# Patient Record
Sex: FEMALE | Race: WHITE | NOT HISPANIC OR LATINO | Employment: FULL TIME | URBAN - METROPOLITAN AREA
[De-identification: names, ages, dates, MRNs, and addresses within clinical notes are randomized per-mention and may not be internally consistent; named-entity substitution may affect disease eponyms.]

---

## 2021-01-01 ENCOUNTER — APPOINTMENT (EMERGENCY)
Dept: RADIOLOGY | Facility: HOSPITAL | Age: 56
DRG: 871 | End: 2021-01-01
Payer: COMMERCIAL

## 2021-01-01 ENCOUNTER — APPOINTMENT (INPATIENT)
Dept: NON INVASIVE DIAGNOSTICS | Facility: HOSPITAL | Age: 56
DRG: 871 | End: 2021-01-01
Payer: COMMERCIAL

## 2021-01-01 ENCOUNTER — APPOINTMENT (INPATIENT)
Dept: RADIOLOGY | Facility: HOSPITAL | Age: 56
DRG: 871 | End: 2021-01-01
Payer: COMMERCIAL

## 2021-01-01 ENCOUNTER — HOSPITAL ENCOUNTER (INPATIENT)
Facility: HOSPITAL | Age: 56
LOS: 1 days | DRG: 871 | End: 2021-07-28
Attending: EMERGENCY MEDICINE | Admitting: INTERNAL MEDICINE
Payer: COMMERCIAL

## 2021-01-01 ENCOUNTER — APPOINTMENT (OUTPATIENT)
Dept: NON INVASIVE DIAGNOSTICS | Facility: HOSPITAL | Age: 56
DRG: 871 | End: 2021-01-01
Attending: EMERGENCY MEDICINE
Payer: COMMERCIAL

## 2021-01-01 VITALS
BODY MASS INDEX: 29.51 KG/M2 | HEART RATE: 93 BPM | DIASTOLIC BLOOD PRESSURE: 84 MMHG | HEIGHT: 66 IN | SYSTOLIC BLOOD PRESSURE: 114 MMHG | TEMPERATURE: 90 F | RESPIRATION RATE: 28 BRPM | WEIGHT: 183.64 LBS | OXYGEN SATURATION: 84 %

## 2021-01-01 DIAGNOSIS — I46.9 CARDIAC ARREST (HCC): ICD-10-CM

## 2021-01-01 DIAGNOSIS — I21.3 STEMI (ST ELEVATION MYOCARDIAL INFARCTION) (HCC): Primary | ICD-10-CM

## 2021-01-01 DIAGNOSIS — R09.2 RESPIRATORY ARREST (HCC): ICD-10-CM

## 2021-01-01 DIAGNOSIS — R57.9 SHOCK (HCC): ICD-10-CM

## 2021-01-01 LAB
ALBUMIN SERPL BCP-MCNC: 1.2 G/DL (ref 3.5–5)
ALBUMIN SERPL BCP-MCNC: 1.7 G/DL (ref 3.5–5)
ALP SERPL-CCNC: 207 U/L (ref 46–116)
ALP SERPL-CCNC: 236 U/L (ref 46–116)
ALT SERPL W P-5'-P-CCNC: 326 U/L (ref 12–78)
ALT SERPL W P-5'-P-CCNC: 420 U/L (ref 12–78)
AMORPH URATE CRY URNS QL MICRO: ABNORMAL /HPF
AMPHETAMINES SERPL QL SCN: NEGATIVE
ANION GAP SERPL CALCULATED.3IONS-SCNC: 26 MMOL/L (ref 4–13)
ANION GAP SERPL CALCULATED.3IONS-SCNC: 26 MMOL/L (ref 4–13)
ANION GAP SERPL CALCULATED.3IONS-SCNC: 29 MMOL/L (ref 4–13)
ANISOCYTOSIS BLD QL SMEAR: PRESENT
APAP SERPL-MCNC: 21.4 UG/ML (ref 10–20)
APTT PPP: 75 SECONDS (ref 23–37)
ARTERIAL PATENCY WRIST A: NO
AST SERPL W P-5'-P-CCNC: 1277 U/L (ref 5–45)
AST SERPL W P-5'-P-CCNC: 1369 U/L (ref 5–45)
ATRIAL RATE: 197 BPM
ATRIAL RATE: 214 BPM
ATRIAL RATE: 68 BPM
BACTERIA UR QL AUTO: ABNORMAL /HPF
BARBITURATES UR QL: NEGATIVE
BASE EXCESS BLDA CALC-SCNC: -12 MMOL/L (ref -2–3)
BASE EXCESS BLDA CALC-SCNC: -23.4 MMOL/L
BASE EXCESS BLDA CALC-SCNC: -29 MMOL/L (ref -2–3)
BASE EXCESS BLDA CALC-SCNC: <-30 MMOL/L (ref -2–3)
BASOPHILS # BLD MANUAL: 0 THOUSAND/UL (ref 0–0.1)
BASOPHILS NFR MAR MANUAL: 0 % (ref 0–1)
BENZODIAZ UR QL: NEGATIVE
BETA-HYDROXYBUTYRATE: 2.2 MMOL/L
BILIRUB SERPL-MCNC: 0.45 MG/DL (ref 0.2–1)
BILIRUB SERPL-MCNC: 0.53 MG/DL (ref 0.2–1)
BILIRUB UR QL STRIP: NEGATIVE
BUN SERPL-MCNC: 60 MG/DL (ref 5–25)
BUN SERPL-MCNC: 67 MG/DL (ref 5–25)
BUN SERPL-MCNC: 68 MG/DL (ref 5–25)
CA-I BLD-SCNC: 1.33 MMOL/L (ref 1.12–1.32)
CALCIUM ALBUM COR SERPL-MCNC: 12.2 MG/DL (ref 8.3–10.1)
CALCIUM ALBUM COR SERPL-MCNC: 12.2 MG/DL (ref 8.3–10.1)
CALCIUM SERPL-MCNC: 10 MG/DL (ref 8.3–10.1)
CALCIUM SERPL-MCNC: 10.4 MG/DL (ref 8.3–10.1)
CALCIUM SERPL-MCNC: 11 MG/DL (ref 8.3–10.1)
CHLORIDE SERPL-SCNC: 75 MMOL/L (ref 100–108)
CHLORIDE SERPL-SCNC: 81 MMOL/L (ref 100–108)
CHLORIDE SERPL-SCNC: 91 MMOL/L (ref 100–108)
CHOLEST SERPL-MCNC: 177 MG/DL (ref 50–200)
CK MB SERPL-MCNC: 1.2 % (ref 0–2.5)
CK MB SERPL-MCNC: 385.3 NG/ML (ref 0–5)
CK SERPL-CCNC: ABNORMAL U/L (ref 26–192)
CLARITY UR: CLEAR
CO2 SERPL-SCNC: 12 MMOL/L (ref 21–32)
CO2 SERPL-SCNC: 18 MMOL/L (ref 21–32)
CO2 SERPL-SCNC: 8 MMOL/L (ref 21–32)
COCAINE UR QL: NEGATIVE
COLOR UR: YELLOW
CREAT SERPL-MCNC: 2.99 MG/DL (ref 0.6–1.3)
CREAT SERPL-MCNC: 3.57 MG/DL (ref 0.6–1.3)
CREAT SERPL-MCNC: 3.61 MG/DL (ref 0.6–1.3)
CRP SERPL QL: 50.1 MG/L
D DIMER PPP FEU-MCNC: 11.78 UG/ML FEU
EOSINOPHIL # BLD MANUAL: 0 THOUSAND/UL (ref 0–0.4)
EOSINOPHIL NFR BLD MANUAL: 0 % (ref 0–6)
ERYTHROCYTE [DISTWIDTH] IN BLOOD BY AUTOMATED COUNT: 14 % (ref 11.6–15.1)
ERYTHROCYTE [DISTWIDTH] IN BLOOD BY AUTOMATED COUNT: 14.4 % (ref 11.6–15.1)
ETHANOL SERPL-MCNC: <3 MG/DL (ref 0–3)
FERRITIN SERPL-MCNC: ABNORMAL NG/ML (ref 8–388)
FIO2 GAS DIL.REBREATH: 100 L
FIO2 GAS DIL.REBREATH: 100 L
GFR SERPL CREATININE-BSD FRML MDRD: 14 ML/MIN/1.73SQ M
GFR SERPL CREATININE-BSD FRML MDRD: 14 ML/MIN/1.73SQ M
GFR SERPL CREATININE-BSD FRML MDRD: 17 ML/MIN/1.73SQ M
GLUCOSE SERPL-MCNC: 517 MG/DL (ref 65–140)
GLUCOSE SERPL-MCNC: 542 MG/DL (ref 65–140)
GLUCOSE SERPL-MCNC: 857 MG/DL (ref 65–140)
GLUCOSE SERPL-MCNC: 913 MG/DL (ref 65–140)
GLUCOSE SERPL-MCNC: >500 MG/DL (ref 65–140)
GLUCOSE SERPL-MCNC: >500 MG/DL (ref 65–140)
GLUCOSE SERPL-MCNC: >600 MG/DL (ref 65–140)
GLUCOSE SERPL-MCNC: >600 MG/DL (ref 65–140)
GLUCOSE UR STRIP-MCNC: ABNORMAL MG/DL
HBV CORE AB SER QL: NORMAL
HBV CORE IGM SER QL: NORMAL
HBV SURFACE AG SER QL: NORMAL
HCO3 BLDA-SCNC: 10.2 MMOL/L (ref 22–28)
HCO3 BLDA-SCNC: 17.7 MMOL/L (ref 22–28)
HCO3 BLDA-SCNC: 8.7 MMOL/L (ref 22–28)
HCO3 BLDA-SCNC: 8.8 MMOL/L (ref 24–30)
HCT VFR BLD AUTO: 43.2 % (ref 34.8–46.1)
HCT VFR BLD AUTO: 55.7 % (ref 34.8–46.1)
HCT VFR BLD CALC: 24 % (ref 34.8–46.1)
HCT VFR BLD CALC: 51 % (ref 34.8–46.1)
HCT VFR BLD CALC: 54 % (ref 34.8–46.1)
HCV AB SER QL: NORMAL
HDLC SERPL-MCNC: 24 MG/DL
HGB BLD-MCNC: 14.1 G/DL (ref 11.5–15.4)
HGB BLD-MCNC: 17.9 G/DL (ref 11.5–15.4)
HGB BLDA-MCNC: 17.3 G/DL (ref 11.5–15.4)
HGB BLDA-MCNC: 18.4 G/DL (ref 11.5–15.4)
HGB BLDA-MCNC: 8.2 G/DL (ref 11.5–15.4)
HGB UR QL STRIP.AUTO: ABNORMAL
HIV 1+2 AB+HIV1 P24 AG SERPL QL IA: NORMAL
HIV1 P24 AG SER QL: NORMAL
HOROWITZ INDEX BLDA+IHG-RTO: 100 MM[HG]
INR PPP: 1.59 (ref 0.84–1.19)
INR PPP: 2.01 (ref 0.84–1.19)
KETONES UR STRIP-MCNC: ABNORMAL MG/DL
LACTATE SERPL-SCNC: 14.7 MMOL/L (ref 0.5–2)
LACTATE SERPL-SCNC: 15.3 MMOL/L (ref 0.5–2)
LACTATE SERPL-SCNC: 16.4 MMOL/L (ref 0.5–2)
LACTATE SERPL-SCNC: 18.5 MMOL/L (ref 0.5–2)
LDLC SERPL CALC-MCNC: 96 MG/DL (ref 0–100)
LEUKOCYTE ESTERASE UR QL STRIP: ABNORMAL
LYMPHOCYTES # BLD AUTO: 27 % (ref 14–44)
LYMPHOCYTES # BLD AUTO: 4.13 THOUSAND/UL (ref 0.6–4.47)
MAGNESIUM SERPL-MCNC: 3.5 MG/DL (ref 1.6–2.6)
MAGNESIUM SERPL-MCNC: 3.6 MG/DL (ref 1.6–2.6)
MAGNESIUM SERPL-MCNC: 4 MG/DL (ref 1.6–2.6)
MCH RBC QN AUTO: 27.9 PG (ref 26.8–34.3)
MCH RBC QN AUTO: 28.1 PG (ref 26.8–34.3)
MCHC RBC AUTO-ENTMCNC: 32.1 G/DL (ref 31.4–37.4)
MCHC RBC AUTO-ENTMCNC: 32.6 G/DL (ref 31.4–37.4)
MCV RBC AUTO: 86 FL (ref 82–98)
MCV RBC AUTO: 87 FL (ref 82–98)
METAMYELOCYTES NFR BLD MANUAL: 1 % (ref 0–1)
METHADONE UR QL: NEGATIVE
MONOCYTES # BLD AUTO: 0.92 THOUSAND/UL (ref 0–1.22)
MONOCYTES NFR BLD: 6 % (ref 4–12)
NEUTROPHILS # BLD MANUAL: 10.1 THOUSAND/UL (ref 1.85–7.62)
NEUTS BAND NFR BLD MANUAL: 9 % (ref 0–8)
NEUTS SEG NFR BLD AUTO: 57 % (ref 43–75)
NITRITE UR QL STRIP: NEGATIVE
NON-SQ EPI CELLS URNS QL MICRO: ABNORMAL /HPF
NRBC BLD AUTO-RTO: 0 /100 WBCS
NRBC BLD AUTO-RTO: 1 /100 WBCS
NT-PROBNP SERPL-MCNC: 6874 PG/ML
O2 CT BLDA-SCNC: 15.3 ML/DL (ref 16–23)
OPIATES UR QL SCN: NEGATIVE
OXYCODONE+OXYMORPHONE UR QL SCN: NEGATIVE
OXYHGB MFR BLDA: 76.8 % (ref 94–97)
P AXIS: 53 DEGREES
PCO2 BLD: 11 MMOL/L (ref 21–32)
PCO2 BLD: 11 MMOL/L (ref 21–32)
PCO2 BLD: 20 MMOL/L (ref 21–32)
PCO2 BLD: 62.2 MM HG (ref 36–44)
PCO2 BLD: 65.2 MM HG (ref 36–44)
PCO2 BLD: 79.1 MM HG (ref 42–50)
PCO2 BLDA: 56.2 MM HG (ref 36–44)
PCP UR QL: NEGATIVE
PEEP RESPIRATORY: 6 CM[H2O]
PH BLD: 6.66 [PH] (ref 7.3–7.4)
PH BLD: 6.74 [PH] (ref 7.35–7.45)
PH BLD: 7.06 [PH] (ref 7.35–7.45)
PH BLDA: 6.88 [PH] (ref 7.35–7.45)
PH UR STRIP.AUTO: 5.5 [PH]
PHOSPHATE SERPL-MCNC: 15.4 MG/DL (ref 2.7–4.5)
PHOSPHATE SERPL-MCNC: >18 MG/DL (ref 2.7–4.5)
PHOSPHATE SERPL-MCNC: >18 MG/DL (ref 2.7–4.5)
PLATELET # BLD AUTO: 156 THOUSANDS/UL (ref 149–390)
PLATELET # BLD AUTO: 186 THOUSANDS/UL (ref 149–390)
PLATELET BLD QL SMEAR: ADEQUATE
PMV BLD AUTO: 11.2 FL (ref 8.9–12.7)
PMV BLD AUTO: 11.4 FL (ref 8.9–12.7)
PO2 BLD: 51 MM HG (ref 75–129)
PO2 BLD: 70 MM HG (ref 75–129)
PO2 BLD: 86 MM HG (ref 35–45)
PO2 BLDA: 66.2 MM HG (ref 75–129)
POTASSIUM BLD-SCNC: 6 MMOL/L (ref 3.5–5.3)
POTASSIUM BLD-SCNC: 7 MMOL/L (ref 3.5–5.3)
POTASSIUM BLD-SCNC: 7.5 MMOL/L (ref 3.5–5.3)
POTASSIUM SERPL-SCNC: 6.7 MMOL/L (ref 3.5–5.3)
POTASSIUM SERPL-SCNC: 7.4 MMOL/L (ref 3.5–5.3)
POTASSIUM SERPL-SCNC: 7.4 MMOL/L (ref 3.5–5.3)
PR INTERVAL: 158 MS
PR INTERVAL: 96 MS
PROCALCITONIN SERPL-MCNC: 5.5 NG/ML
PROT SERPL-MCNC: 3.8 G/DL (ref 6.4–8.2)
PROT SERPL-MCNC: 5.2 G/DL (ref 6.4–8.2)
PROT UR STRIP-MCNC: ABNORMAL MG/DL
PROTHROMBIN TIME: 19 SECONDS (ref 11.6–14.5)
PROTHROMBIN TIME: 22.8 SECONDS (ref 11.6–14.5)
QRS AXIS: 18 DEGREES
QRS AXIS: 43 DEGREES
QRS AXIS: 53 DEGREES
QRSD INTERVAL: 70 MS
QRSD INTERVAL: 82 MS
QRSD INTERVAL: 96 MS
QT INTERVAL: 256 MS
QT INTERVAL: 318 MS
QT INTERVAL: 398 MS
QTC INTERVAL: 391 MS
QTC INTERVAL: 418 MS
QTC INTERVAL: 423 MS
RBC # BLD AUTO: 5.05 MILLION/UL (ref 3.81–5.12)
RBC # BLD AUTO: 6.38 MILLION/UL (ref 3.81–5.12)
RBC #/AREA URNS AUTO: ABNORMAL /HPF
SAO2 % BLD FROM PO2: 48 % (ref 60–85)
SAO2 % BLD FROM PO2: 74 % (ref 60–85)
SAO2 % BLD FROM PO2: 84 % (ref 60–85)
SARS-COV-2 RNA RESP QL NAA+PROBE: POSITIVE
SODIUM BLD-SCNC: 109 MMOL/L (ref 136–145)
SODIUM BLD-SCNC: 110 MMOL/L (ref 136–145)
SODIUM BLD-SCNC: 126 MMOL/L (ref 136–145)
SODIUM SERPL-SCNC: 112 MMOL/L (ref 136–145)
SODIUM SERPL-SCNC: 119 MMOL/L (ref 136–145)
SODIUM SERPL-SCNC: 135 MMOL/L (ref 136–145)
SP GR UR STRIP.AUTO: 1.02 (ref 1–1.03)
SPECIMEN SOURCE: ABNORMAL
T WAVE AXIS: 41 DEGREES
T WAVE AXIS: 54 DEGREES
T WAVE AXIS: 69 DEGREES
THC UR QL: NEGATIVE
TOTAL CELLS COUNTED SPEC: 100
TRIGL SERPL-MCNC: 283 MG/DL
TROPONIN I SERPL-MCNC: 0.36 NG/ML
TROPONIN I SERPL-MCNC: 0.74 NG/ML
TROPONIN I SERPL-MCNC: 0.84 NG/ML
TSH SERPL DL<=0.05 MIU/L-ACNC: 20.2 UIU/ML (ref 0.36–3.74)
TSH SERPL DL<=0.05 MIU/L-ACNC: 32.66 UIU/ML (ref 0.36–3.74)
UROBILINOGEN UR QL STRIP.AUTO: 0.2 E.U./DL
VENT AC: 24
VENT- AC: AC
VENTRICULAR RATE: 160 BPM
VENTRICULAR RATE: 68 BPM
VENTRICULAR RATE: 91 BPM
VT SETTING VENT: 400 ML
WBC # BLD AUTO: 15.3 THOUSAND/UL (ref 4.31–10.16)
WBC # BLD AUTO: 8.4 THOUSAND/UL (ref 4.31–10.16)
WBC #/AREA URNS AUTO: ABNORMAL /HPF

## 2021-01-01 PROCEDURE — 0BH17EZ INSERTION OF ENDOTRACHEAL AIRWAY INTO TRACHEA, VIA NATURAL OR ARTIFICIAL OPENING: ICD-10-PCS | Performed by: EMERGENCY MEDICINE

## 2021-01-01 PROCEDURE — 87340 HEPATITIS B SURFACE AG IA: CPT | Performed by: FAMILY MEDICINE

## 2021-01-01 PROCEDURE — 71045 X-RAY EXAM CHEST 1 VIEW: CPT

## 2021-01-01 PROCEDURE — 84295 ASSAY OF SERUM SODIUM: CPT

## 2021-01-01 PROCEDURE — 82553 CREATINE MB FRACTION: CPT | Performed by: PHYSICIAN ASSISTANT

## 2021-01-01 PROCEDURE — 36600 WITHDRAWAL OF ARTERIAL BLOOD: CPT

## 2021-01-01 PROCEDURE — 87449 NOS EACH ORGANISM AG IA: CPT | Performed by: PHYSICIAN ASSISTANT

## 2021-01-01 PROCEDURE — 86705 HEP B CORE ANTIBODY IGM: CPT | Performed by: FAMILY MEDICINE

## 2021-01-01 PROCEDURE — 85379 FIBRIN DEGRADATION QUANT: CPT | Performed by: FAMILY MEDICINE

## 2021-01-01 PROCEDURE — 82550 ASSAY OF CK (CPK): CPT | Performed by: PHYSICIAN ASSISTANT

## 2021-01-01 PROCEDURE — C1894 INTRO/SHEATH, NON-LASER: HCPCS | Performed by: EMERGENCY MEDICINE

## 2021-01-01 PROCEDURE — 86704 HEP B CORE ANTIBODY TOTAL: CPT | Performed by: FAMILY MEDICINE

## 2021-01-01 PROCEDURE — 83735 ASSAY OF MAGNESIUM: CPT | Performed by: PHYSICIAN ASSISTANT

## 2021-01-01 PROCEDURE — 84100 ASSAY OF PHOSPHORUS: CPT | Performed by: NURSE PRACTITIONER

## 2021-01-01 PROCEDURE — 5A1935Z RESPIRATORY VENTILATION, LESS THAN 24 CONSECUTIVE HOURS: ICD-10-PCS | Performed by: EMERGENCY MEDICINE

## 2021-01-01 PROCEDURE — 84484 ASSAY OF TROPONIN QUANT: CPT | Performed by: PHYSICIAN ASSISTANT

## 2021-01-01 PROCEDURE — 85610 PROTHROMBIN TIME: CPT | Performed by: EMERGENCY MEDICINE

## 2021-01-01 PROCEDURE — 84443 ASSAY THYROID STIM HORMONE: CPT | Performed by: PHYSICIAN ASSISTANT

## 2021-01-01 PROCEDURE — 94760 N-INVAS EAR/PLS OXIMETRY 1: CPT

## 2021-01-01 PROCEDURE — 85007 BL SMEAR W/DIFF WBC COUNT: CPT | Performed by: EMERGENCY MEDICINE

## 2021-01-01 PROCEDURE — 93005 ELECTROCARDIOGRAM TRACING: CPT

## 2021-01-01 PROCEDURE — 80307 DRUG TEST PRSMV CHEM ANLYZR: CPT | Performed by: PHYSICIAN ASSISTANT

## 2021-01-01 PROCEDURE — 85027 COMPLETE CBC AUTOMATED: CPT | Performed by: PHYSICIAN ASSISTANT

## 2021-01-01 PROCEDURE — 36415 COLL VENOUS BLD VENIPUNCTURE: CPT | Performed by: EMERGENCY MEDICINE

## 2021-01-01 PROCEDURE — 82947 ASSAY GLUCOSE BLOOD QUANT: CPT

## 2021-01-01 PROCEDURE — 94002 VENT MGMT INPAT INIT DAY: CPT

## 2021-01-01 PROCEDURE — 86803 HEPATITIS C AB TEST: CPT | Performed by: FAMILY MEDICINE

## 2021-01-01 PROCEDURE — C1769 GUIDE WIRE: HCPCS | Performed by: EMERGENCY MEDICINE

## 2021-01-01 PROCEDURE — B2111ZZ FLUOROSCOPY OF MULTIPLE CORONARY ARTERIES USING LOW OSMOLAR CONTRAST: ICD-10-PCS | Performed by: INTERNAL MEDICINE

## 2021-01-01 PROCEDURE — 99291 CRITICAL CARE FIRST HOUR: CPT | Performed by: INTERNAL MEDICINE

## 2021-01-01 PROCEDURE — 93458 L HRT ARTERY/VENTRICLE ANGIO: CPT | Performed by: INTERNAL MEDICINE

## 2021-01-01 PROCEDURE — 84132 ASSAY OF SERUM POTASSIUM: CPT

## 2021-01-01 PROCEDURE — 80061 LIPID PANEL: CPT | Performed by: FAMILY MEDICINE

## 2021-01-01 PROCEDURE — U0005 INFEC AGEN DETEC AMPLI PROBE: HCPCS | Performed by: EMERGENCY MEDICINE

## 2021-01-01 PROCEDURE — 85014 HEMATOCRIT: CPT

## 2021-01-01 PROCEDURE — 84484 ASSAY OF TROPONIN QUANT: CPT | Performed by: INTERNAL MEDICINE

## 2021-01-01 PROCEDURE — 82948 REAGENT STRIP/BLOOD GLUCOSE: CPT

## 2021-01-01 PROCEDURE — 83605 ASSAY OF LACTIC ACID: CPT | Performed by: PHYSICIAN ASSISTANT

## 2021-01-01 PROCEDURE — 99291 CRITICAL CARE FIRST HOUR: CPT | Performed by: EMERGENCY MEDICINE

## 2021-01-01 PROCEDURE — 82803 BLOOD GASES ANY COMBINATION: CPT

## 2021-01-01 PROCEDURE — 93325 DOPPLER ECHO COLOR FLOW MAPG: CPT | Performed by: INTERNAL MEDICINE

## 2021-01-01 PROCEDURE — 82330 ASSAY OF CALCIUM: CPT | Performed by: PHYSICIAN ASSISTANT

## 2021-01-01 PROCEDURE — 84145 PROCALCITONIN (PCT): CPT | Performed by: FAMILY MEDICINE

## 2021-01-01 PROCEDURE — 85027 COMPLETE CBC AUTOMATED: CPT | Performed by: EMERGENCY MEDICINE

## 2021-01-01 PROCEDURE — 93308 TTE F-UP OR LMTD: CPT | Performed by: INTERNAL MEDICINE

## 2021-01-01 PROCEDURE — 85730 THROMBOPLASTIN TIME PARTIAL: CPT | Performed by: EMERGENCY MEDICINE

## 2021-01-01 PROCEDURE — 99291 CRITICAL CARE FIRST HOUR: CPT

## 2021-01-01 PROCEDURE — 06HY33Z INSERTION OF INFUSION DEVICE INTO LOWER VEIN, PERCUTANEOUS APPROACH: ICD-10-PCS | Performed by: INTERNAL MEDICINE

## 2021-01-01 PROCEDURE — NC001 PR NO CHARGE: Performed by: INTERNAL MEDICINE

## 2021-01-01 PROCEDURE — B2151ZZ FLUOROSCOPY OF LEFT HEART USING LOW OSMOLAR CONTRAST: ICD-10-PCS | Performed by: INTERNAL MEDICINE

## 2021-01-01 PROCEDURE — 84443 ASSAY THYROID STIM HORMONE: CPT | Performed by: FAMILY MEDICINE

## 2021-01-01 PROCEDURE — 82010 KETONE BODYS QUAN: CPT | Performed by: PHYSICIAN ASSISTANT

## 2021-01-01 PROCEDURE — 93321 DOPPLER ECHO F-UP/LMTD STD: CPT | Performed by: INTERNAL MEDICINE

## 2021-01-01 PROCEDURE — 96374 THER/PROPH/DIAG INJ IV PUSH: CPT

## 2021-01-01 PROCEDURE — 94640 AIRWAY INHALATION TREATMENT: CPT

## 2021-01-01 PROCEDURE — 84439 ASSAY OF FREE THYROXINE: CPT | Performed by: FAMILY MEDICINE

## 2021-01-01 PROCEDURE — 83880 ASSAY OF NATRIURETIC PEPTIDE: CPT | Performed by: FAMILY MEDICINE

## 2021-01-01 PROCEDURE — 31500 INSERT EMERGENCY AIRWAY: CPT | Performed by: EMERGENCY MEDICINE

## 2021-01-01 PROCEDURE — 82728 ASSAY OF FERRITIN: CPT | Performed by: FAMILY MEDICINE

## 2021-01-01 PROCEDURE — 81001 URINALYSIS AUTO W/SCOPE: CPT | Performed by: PHYSICIAN ASSISTANT

## 2021-01-01 PROCEDURE — 93010 ELECTROCARDIOGRAM REPORT: CPT | Performed by: INTERNAL MEDICINE

## 2021-01-01 PROCEDURE — 83605 ASSAY OF LACTIC ACID: CPT | Performed by: INTERNAL MEDICINE

## 2021-01-01 PROCEDURE — 86140 C-REACTIVE PROTEIN: CPT | Performed by: FAMILY MEDICINE

## 2021-01-01 PROCEDURE — 76937 US GUIDE VASCULAR ACCESS: CPT | Performed by: INTERNAL MEDICINE

## 2021-01-01 PROCEDURE — 36556 INSERT NON-TUNNEL CV CATH: CPT | Performed by: INTERNAL MEDICINE

## 2021-01-01 PROCEDURE — 80143 DRUG ASSAY ACETAMINOPHEN: CPT | Performed by: FAMILY MEDICINE

## 2021-01-01 PROCEDURE — 87040 BLOOD CULTURE FOR BACTERIA: CPT | Performed by: PHYSICIAN ASSISTANT

## 2021-01-01 PROCEDURE — 94150 VITAL CAPACITY TEST: CPT

## 2021-01-01 PROCEDURE — 92950 HEART/LUNG RESUSCITATION CPR: CPT

## 2021-01-01 PROCEDURE — 83970 ASSAY OF PARATHORMONE: CPT | Performed by: FAMILY MEDICINE

## 2021-01-01 PROCEDURE — 93308 TTE F-UP OR LMTD: CPT

## 2021-01-01 PROCEDURE — 82077 ASSAY SPEC XCP UR&BREATH IA: CPT | Performed by: FAMILY MEDICINE

## 2021-01-01 PROCEDURE — 99292 CRITICAL CARE ADDL 30 MIN: CPT | Performed by: INTERNAL MEDICINE

## 2021-01-01 PROCEDURE — 5A12012 PERFORMANCE OF CARDIAC OUTPUT, SINGLE, MANUAL: ICD-10-PCS | Performed by: EMERGENCY MEDICINE

## 2021-01-01 PROCEDURE — 83735 ASSAY OF MAGNESIUM: CPT | Performed by: NURSE PRACTITIONER

## 2021-01-01 PROCEDURE — 80053 COMPREHEN METABOLIC PANEL: CPT | Performed by: PHYSICIAN ASSISTANT

## 2021-01-01 PROCEDURE — 85610 PROTHROMBIN TIME: CPT | Performed by: PHYSICIAN ASSISTANT

## 2021-01-01 PROCEDURE — 80048 BASIC METABOLIC PNL TOTAL CA: CPT | Performed by: NURSE PRACTITIONER

## 2021-01-01 PROCEDURE — 4A023N7 MEASUREMENT OF CARDIAC SAMPLING AND PRESSURE, LEFT HEART, PERCUTANEOUS APPROACH: ICD-10-PCS | Performed by: INTERNAL MEDICINE

## 2021-01-01 PROCEDURE — 82805 BLOOD GASES W/O2 SATURATION: CPT | Performed by: PHYSICIAN ASSISTANT

## 2021-01-01 PROCEDURE — 84100 ASSAY OF PHOSPHORUS: CPT | Performed by: PHYSICIAN ASSISTANT

## 2021-01-01 PROCEDURE — 83605 ASSAY OF LACTIC ACID: CPT | Performed by: NURSE PRACTITIONER

## 2021-01-01 PROCEDURE — 87806 HIV AG W/HIV1&2 ANTB W/OPTIC: CPT | Performed by: FAMILY MEDICINE

## 2021-01-01 PROCEDURE — 80053 COMPREHEN METABOLIC PANEL: CPT | Performed by: INTERNAL MEDICINE

## 2021-01-01 PROCEDURE — U0003 INFECTIOUS AGENT DETECTION BY NUCLEIC ACID (DNA OR RNA); SEVERE ACUTE RESPIRATORY SYNDROME CORONAVIRUS 2 (SARS-COV-2) (CORONAVIRUS DISEASE [COVID-19]), AMPLIFIED PROBE TECHNIQUE, MAKING USE OF HIGH THROUGHPUT TECHNOLOGIES AS DESCRIBED BY CMS-2020-01-R: HCPCS | Performed by: EMERGENCY MEDICINE

## 2021-01-01 RX ORDER — PHENTOLAMINE MESYLATE 5 MG/1
5 INJECTION INTRAMUSCULAR; INTRAVENOUS ONCE
Status: COMPLETED | OUTPATIENT
Start: 2021-01-01 | End: 2021-01-01

## 2021-01-01 RX ORDER — SODIUM CHLORIDE 30 MG/ML INHALATION SOLUTION 30 MG/ML
4 SOLUTION INHALANT
Status: DISCONTINUED | OUTPATIENT
Start: 2021-01-01 | End: 2021-01-01 | Stop reason: HOSPADM

## 2021-01-01 RX ORDER — ASCORBIC ACID 500 MG
1000 TABLET ORAL EVERY 12 HOURS SCHEDULED
Status: DISCONTINUED | OUTPATIENT
Start: 2021-01-01 | End: 2021-01-01 | Stop reason: HOSPADM

## 2021-01-01 RX ORDER — SODIUM BICARBONATE 84 MG/ML
INJECTION, SOLUTION INTRAVENOUS CODE/TRAUMA/SEDATION MEDICATION
Status: COMPLETED | OUTPATIENT
Start: 2021-01-01 | End: 2021-01-01

## 2021-01-01 RX ORDER — SODIUM CHLORIDE FOR INHALATION 0.9 %
VIAL, NEBULIZER (ML) INHALATION
Status: COMPLETED
Start: 2021-01-01 | End: 2021-01-01

## 2021-01-01 RX ORDER — CALCIUM CHLORIDE 100 MG/ML
SYRINGE (ML) INTRAVENOUS CODE/TRAUMA/SEDATION MEDICATION
Status: COMPLETED | OUTPATIENT
Start: 2021-01-01 | End: 2021-01-01

## 2021-01-01 RX ORDER — HEPARIN SODIUM 1000 [USP'U]/ML
4000 INJECTION, SOLUTION INTRAVENOUS; SUBCUTANEOUS ONCE
Status: COMPLETED | OUTPATIENT
Start: 2021-01-01 | End: 2021-01-01

## 2021-01-01 RX ORDER — ZINC SULFATE 50(220)MG
220 CAPSULE ORAL DAILY
Status: DISCONTINUED | OUTPATIENT
Start: 2021-01-01 | End: 2021-01-01 | Stop reason: HOSPADM

## 2021-01-01 RX ORDER — SODIUM CHLORIDE, SODIUM GLUCONATE, SODIUM ACETATE, POTASSIUM CHLORIDE, MAGNESIUM CHLORIDE, SODIUM PHOSPHATE, DIBASIC, AND POTASSIUM PHOSPHATE .53; .5; .37; .037; .03; .012; .00082 G/100ML; G/100ML; G/100ML; G/100ML; G/100ML; G/100ML; G/100ML
1000 INJECTION, SOLUTION INTRAVENOUS ONCE
Status: COMPLETED | OUTPATIENT
Start: 2021-01-01 | End: 2021-01-01

## 2021-01-01 RX ORDER — DEXAMETHASONE SODIUM PHOSPHATE 10 MG/ML
0.1 INJECTION, SOLUTION INTRAMUSCULAR; INTRAVENOUS EVERY 12 HOURS
Status: DISCONTINUED | OUTPATIENT
Start: 2021-01-01 | End: 2021-01-01 | Stop reason: HOSPADM

## 2021-01-01 RX ORDER — SODIUM CHLORIDE, SODIUM GLUCONATE, SODIUM ACETATE, POTASSIUM CHLORIDE, MAGNESIUM CHLORIDE, SODIUM PHOSPHATE, DIBASIC, AND POTASSIUM PHOSPHATE .53; .5; .37; .037; .03; .012; .00082 G/100ML; G/100ML; G/100ML; G/100ML; G/100ML; G/100ML; G/100ML
499 INJECTION, SOLUTION INTRAVENOUS ONCE
Status: COMPLETED | OUTPATIENT
Start: 2021-01-01 | End: 2021-01-01

## 2021-01-01 RX ORDER — CALCIUM CHLORIDE 100 MG/ML
1 INJECTION INTRAVENOUS; INTRAVENTRICULAR ONCE
Status: DISCONTINUED | OUTPATIENT
Start: 2021-01-01 | End: 2021-01-01

## 2021-01-01 RX ORDER — MULTIVIT-MIN/FERROUS GLUCONATE 9 MG/15 ML
15 LIQUID (ML) ORAL DAILY
Status: DISCONTINUED | OUTPATIENT
Start: 2021-08-04 | End: 2021-01-01 | Stop reason: HOSPADM

## 2021-01-01 RX ORDER — LEVALBUTEROL 1.25 MG/.5ML
1.25 SOLUTION, CONCENTRATE RESPIRATORY (INHALATION)
Status: DISCONTINUED | OUTPATIENT
Start: 2021-01-01 | End: 2021-01-01 | Stop reason: HOSPADM

## 2021-01-01 RX ORDER — MELATONIN
2000 DAILY
Status: DISCONTINUED | OUTPATIENT
Start: 2021-01-01 | End: 2021-01-01 | Stop reason: HOSPADM

## 2021-01-01 RX ORDER — EPINEPHRINE 0.1 MG/ML
SYRINGE (ML) INJECTION CODE/TRAUMA/SEDATION MEDICATION
Status: COMPLETED | OUTPATIENT
Start: 2021-01-01 | End: 2021-01-01

## 2021-01-01 RX ORDER — SODIUM CHLORIDE 9 MG/ML
3 INJECTION INTRAVENOUS
Status: DISCONTINUED | OUTPATIENT
Start: 2021-01-01 | End: 2021-01-01 | Stop reason: HOSPADM

## 2021-01-01 RX ORDER — CALCIUM GLUCONATE 20 MG/ML
1 INJECTION, SOLUTION INTRAVENOUS ONCE
Status: COMPLETED | OUTPATIENT
Start: 2021-01-01 | End: 2021-01-01

## 2021-01-01 RX ORDER — PANTOPRAZOLE SODIUM 40 MG/1
40 INJECTION, POWDER, FOR SOLUTION INTRAVENOUS EVERY 12 HOURS SCHEDULED
Status: DISCONTINUED | OUTPATIENT
Start: 2021-01-01 | End: 2021-01-01 | Stop reason: HOSPADM

## 2021-01-01 RX ORDER — ATORVASTATIN CALCIUM 40 MG/1
40 TABLET, FILM COATED ORAL
Status: DISCONTINUED | OUTPATIENT
Start: 2021-01-01 | End: 2021-01-01 | Stop reason: HOSPADM

## 2021-01-01 RX ORDER — DOXYCYCLINE HYCLATE 100 MG/1
100 CAPSULE ORAL EVERY 12 HOURS
Status: DISCONTINUED | OUTPATIENT
Start: 2021-01-01 | End: 2021-01-01

## 2021-01-01 RX ORDER — VECURONIUM BROMIDE 1 MG/ML
10 INJECTION, POWDER, LYOPHILIZED, FOR SOLUTION INTRAVENOUS ONCE
Status: COMPLETED | OUTPATIENT
Start: 2021-01-01 | End: 2021-01-01

## 2021-01-01 RX ORDER — PROPOFOL 10 MG/ML
5-50 INJECTION, EMULSION INTRAVENOUS
Status: DISCONTINUED | OUTPATIENT
Start: 2021-01-01 | End: 2021-01-01 | Stop reason: HOSPADM

## 2021-01-01 RX ORDER — SODIUM CHLORIDE FOR INHALATION 0.9 %
12 VIAL, NEBULIZER (ML) INHALATION ONCE
Status: COMPLETED | OUTPATIENT
Start: 2021-01-01 | End: 2021-01-01

## 2021-01-01 RX ADMIN — EPINEPHRINE 1 MG: 0.1 INJECTION, SOLUTION ENDOTRACHEAL; INTRACARDIAC; INTRAVENOUS at 12:04

## 2021-01-01 RX ADMIN — PROPOFOL 5 MCG/KG/MIN: 10 INJECTION, EMULSION INTRAVENOUS at 16:30

## 2021-01-01 RX ADMIN — SODIUM BICARBONATE 150 ML/HR: 84 INJECTION, SOLUTION INTRAVENOUS at 15:37

## 2021-01-01 RX ADMIN — SODIUM CHLORIDE, SODIUM GLUCONATE, SODIUM ACETATE, POTASSIUM CHLORIDE, MAGNESIUM CHLORIDE, SODIUM PHOSPHATE, DIBASIC, AND POTASSIUM PHOSPHATE 1000 ML: .53; .5; .37; .037; .03; .012; .00082 INJECTION, SOLUTION INTRAVENOUS at 17:15

## 2021-01-01 RX ADMIN — PHENTOLAMINE MESYLATE 5 MG: 5 INJECTION, POWDER, FOR SOLUTION INTRAMUSCULAR; INTRAVENOUS at 18:51

## 2021-01-01 RX ADMIN — SODIUM CHLORIDE, SODIUM GLUCONATE, SODIUM ACETATE, POTASSIUM CHLORIDE, MAGNESIUM CHLORIDE, SODIUM PHOSPHATE, DIBASIC, AND POTASSIUM PHOSPHATE 1000 ML: .53; .5; .37; .037; .03; .012; .00082 INJECTION, SOLUTION INTRAVENOUS at 18:04

## 2021-01-01 RX ADMIN — INSULIN HUMAN 10 UNITS: 100 INJECTION, SOLUTION PARENTERAL at 15:36

## 2021-01-01 RX ADMIN — CALCIUM CHLORIDE 1 G: 100 INJECTION PARENTERAL at 18:19

## 2021-01-01 RX ADMIN — DEXAMETHASONE SODIUM PHOSPHATE 8.3 MG: 10 INJECTION, SOLUTION INTRAMUSCULAR; INTRAVENOUS at 16:40

## 2021-01-01 RX ADMIN — SODIUM BICARBONATE 50 MEQ: 84 INJECTION PARENTERAL at 14:10

## 2021-01-01 RX ADMIN — SODIUM BICARBONATE 100 MEQ: 84 INJECTION, SOLUTION INTRAVENOUS at 18:19

## 2021-01-01 RX ADMIN — TICAGRELOR 180 MG: 90 TABLET ORAL at 13:13

## 2021-01-01 RX ADMIN — NOREPINEPHRINE BITARTRATE 5 MCG/MIN: 1 INJECTION INTRAVENOUS at 13:28

## 2021-01-01 RX ADMIN — IOHEXOL 60 ML: 350 INJECTION, SOLUTION INTRAVENOUS at 14:13

## 2021-01-01 RX ADMIN — SODIUM CHLORIDE SOLN NEBU 3% 4 ML: 3 NEBU SOLN at 15:49

## 2021-01-01 RX ADMIN — SODIUM CHLORIDE, SODIUM GLUCONATE, SODIUM ACETATE, POTASSIUM CHLORIDE, MAGNESIUM CHLORIDE, SODIUM PHOSPHATE, DIBASIC, AND POTASSIUM PHOSPHATE 1000 ML: .53; .5; .37; .037; .03; .012; .00082 INJECTION, SOLUTION INTRAVENOUS at 16:45

## 2021-01-01 RX ADMIN — LEVALBUTEROL HYDROCHLORIDE 1.25 MG: 1.25 SOLUTION, CONCENTRATE RESPIRATORY (INHALATION) at 15:49

## 2021-01-01 RX ADMIN — DOXYCYCLINE 100 MG: 100 INJECTION, POWDER, LYOPHILIZED, FOR SOLUTION INTRAVENOUS at 16:24

## 2021-01-01 RX ADMIN — SODIUM BICARBONATE 50 MEQ: 84 INJECTION, SOLUTION INTRAVENOUS at 16:00

## 2021-01-01 RX ADMIN — Medication 12 ML: at 17:03

## 2021-01-01 RX ADMIN — Medication 20 MCG/MIN: at 17:40

## 2021-01-01 RX ADMIN — HEPARIN SODIUM 4000 UNITS: 1000 INJECTION INTRAVENOUS; SUBCUTANEOUS at 12:43

## 2021-01-01 RX ADMIN — SODIUM BICARBONATE 100 MEQ: 84 INJECTION, SOLUTION INTRAVENOUS at 17:01

## 2021-01-01 RX ADMIN — EPINEPHRINE 1 MG: 0.1 INJECTION, SOLUTION ENDOTRACHEAL; INTRACARDIAC; INTRAVENOUS at 12:09

## 2021-01-01 RX ADMIN — SODIUM CHLORIDE 10 UNITS/HR: 9 INJECTION, SOLUTION INTRAVENOUS at 16:03

## 2021-01-01 RX ADMIN — CALCIUM GLUCONATE 1 G: 20 INJECTION, SOLUTION INTRAVENOUS at 17:11

## 2021-01-01 RX ADMIN — VECURONIUM BROMIDE 10 MG: 1 INJECTION, POWDER, LYOPHILIZED, FOR SOLUTION INTRAVENOUS at 18:08

## 2021-01-01 RX ADMIN — INSULIN HUMAN 10 UNITS: 100 INJECTION, SOLUTION PARENTERAL at 17:06

## 2021-01-01 RX ADMIN — ISODIUM CHLORIDE 12 ML: 0.03 SOLUTION RESPIRATORY (INHALATION) at 17:03

## 2021-01-01 RX ADMIN — EPINEPHRINE 1 MG: 0.1 INJECTION, SOLUTION ENDOTRACHEAL; INTRACARDIAC; INTRAVENOUS at 18:20

## 2021-01-01 RX ADMIN — SODIUM CHLORIDE, SODIUM GLUCONATE, SODIUM ACETATE, POTASSIUM CHLORIDE, MAGNESIUM CHLORIDE, SODIUM PHOSPHATE, DIBASIC, AND POTASSIUM PHOSPHATE 499 ML: .53; .5; .37; .037; .03; .012; .00082 INJECTION, SOLUTION INTRAVENOUS at 17:34

## 2021-01-01 RX ADMIN — VASOPRESSIN 0.04 UNITS/MIN: 20 INJECTION INTRAVENOUS at 16:57

## 2021-01-01 RX ADMIN — CALCIUM CHLORIDE 1 G: 100 INJECTION PARENTERAL at 12:09

## 2021-01-01 RX ADMIN — WATER: 1 INJECTION INTRAMUSCULAR; INTRAVENOUS; SUBCUTANEOUS at 18:44

## 2021-01-01 RX ADMIN — PERFLUTREN 0.6 ML/MIN: 6.52 INJECTION, SUSPENSION INTRAVENOUS at 13:45

## 2021-01-01 RX ADMIN — SODIUM BICARBONATE 100 MEQ: 84 INJECTION, SOLUTION INTRAVENOUS at 16:38

## 2021-01-01 RX ADMIN — ALBUTEROL SULFATE 10 MG: 2.5 SOLUTION RESPIRATORY (INHALATION) at 17:04

## 2021-01-01 RX ADMIN — NOREPINEPHRINE BITARTRATE 5 MCG/MIN: 1 INJECTION INTRAVENOUS at 12:22

## 2021-01-01 RX ADMIN — SODIUM BICARBONATE 50 MEQ: 84 INJECTION PARENTERAL at 12:08

## 2021-07-28 PROBLEM — U07.1 PNEUMONIA DUE TO COVID-19 VIRUS: Status: ACTIVE | Noted: 2021-01-01

## 2021-07-28 PROBLEM — J96.02 ACUTE RESPIRATORY FAILURE WITH HYPOXIA AND HYPERCAPNIA (HCC): Status: ACTIVE | Noted: 2021-01-01

## 2021-07-28 PROBLEM — E87.2 ACIDOSIS: Status: ACTIVE | Noted: 2021-01-01

## 2021-07-28 PROBLEM — J12.82 PNEUMONIA DUE TO COVID-19 VIRUS: Status: ACTIVE | Noted: 2021-01-01

## 2021-07-28 PROBLEM — E83.39 HYPERPHOSPHATEMIA: Status: ACTIVE | Noted: 2021-01-01

## 2021-07-28 PROBLEM — I46.9 CARDIAC ARREST (HCC): Status: ACTIVE | Noted: 2021-01-01

## 2021-07-28 PROBLEM — R73.9 BLOOD GLUCOSE ELEVATED: Status: ACTIVE | Noted: 2021-01-01

## 2021-07-28 PROBLEM — G92.8 TOXIC METABOLIC ENCEPHALOPATHY: Status: ACTIVE | Noted: 2021-01-01

## 2021-07-28 PROBLEM — R79.89 ELEVATED LFTS: Status: ACTIVE | Noted: 2021-01-01

## 2021-07-28 PROBLEM — J96.01 ACUTE RESPIRATORY FAILURE WITH HYPOXIA AND HYPERCAPNIA (HCC): Status: ACTIVE | Noted: 2021-01-01

## 2021-07-28 PROBLEM — E87.5 HYPERKALEMIA: Status: ACTIVE | Noted: 2021-01-01

## 2021-07-28 PROBLEM — A41.9 SEPTIC SHOCK (HCC): Status: ACTIVE | Noted: 2021-01-01

## 2021-07-28 PROBLEM — R65.21 SEPTIC SHOCK (HCC): Status: ACTIVE | Noted: 2021-01-01

## 2021-07-28 NOTE — PROGRESS NOTES
61-year-old female MI alert for ST-elevation post arrest anterior and inferior leads  On arrival to cath lab patient intubated, coffee-ground type emesis from OG 2  He is COVID positive  Family also COVID positive  Neurologically, not doing anything purposeful  She does not withdraw to pain  Pupils appear fixed  Unclear definitive down time with EMS    Discussion with critical care due to unknown down time as a result, proceed with cardiac catheterization

## 2021-07-28 NOTE — CONSULTS
Consultation - Cardiology Team One  [facility] 49 y o  female MRN: [id number]  Unit/Bed#: ISHAN Encounter: [id number]        Impression and plan:      61-year-old recently was diagnosed with COVID-known exposure to daughter and granddaughter who also tested for COVID,, she was  unvaccinated presented to the ER via EMS-summoned for chest discomfort  It appears that she might have had seizure activity enroute to the ER and was bradycardic  A PA arrest occurred in the ER, underwent 2 rounds of CPR and 2 doses of epinephrine with return of spontaneous circulation  ECGs have shown predominantly inferolateral ST elevations and an MI alert was called  On my review, she was intubated, not on any sedation, had not received any atropine, had no spontaneous respiratory activity, no movements, no response to sternal rub and pupils were dilated and fixed  Ultimately she underwent coronary angiography without any critical lesions  Plan:    ST elevations- inferolateral leads:  Unable to review coronary angiography images but per report, no obstructive CAD  ECG changes be secondary to metabolic abnormalities-hyperkalemia and acidosis    Out of hospital cardiac arrest:  PEA arrest, i-STAT potassium was 7 5 at presentation, 7 on repeat  Still acidotic  Would treat these abnormalities  Limited echo  Check BMP  Status post calcium gluconate and currently on sodium bicarbonate    Hypotension:  Has metabolic acidosis-correction should help improve blood pressure  With normal EF, unlikely to be cardiac related  COVID post status:  Currently intubated, defer to the primary team        Inpatient consult to Cardiology  Consult performed by: Anastacio Cantu MD  Consult ordered by: Azalea Chávez MD      Physician Requesting Consult: Azalea Chávez MD  Reason for Consult / Principal Problem:  Cardiac arrest      Assessment/ Plan    1   Cardiac arrest  ECG showed ST elevation anterior inferior leads  MI alert called  Status post cardiac catheterization showing no obstructive coronary artery disease and LVEF normal  On norepinephrine drip    2  COVID-19  Patient intubated  Followed by primary team        History of Present Illness   HPI: Andie Suh is a 49y o  year old female who has no known past medical history  She presents to [facility] ER 07/28/2021 via EMS  Per records, patient had chest discomfort this morning and was lethargic per family  EMS was called and it was noted on arrival patient was alert but lethargic  On arrival to ER, patient's heart rate was 20s and pulseless  CPR and ACLS was initiated  Patient had 2 rounds of CPR and 2 doses of epi with ROSC  Patient was intubated in ER for airway protection  ECG showed ST elevations in anterior and inferior leads  MI later was called  Cardiac catheterization showed no obstructive coronary artery disease and LVEF was normal        Review of Systems   Unable to perform ROS: intubated     Historical Information   No past medical history on file  No past surgical history on file  Social History     Substance and Sexual Activity   Alcohol Use Not on file     Social History     Substance and Sexual Activity   Drug Use Not on file     Social History     Tobacco Use   Smoking Status Not on file     Family History: No family history on file  Meds/Allergies   all current active meds have been reviewed and current meds:   Current Facility-Administered Medications   Medication Dose Route Frequency    propofol (DIPRIVAN) 1000 mg in 100 mL infusion (premix)  5-50 mcg/kg/min (Order-Specific) Intravenous Titrated    sodium chloride (PF) 0 9 % injection 3 mL  3 mL Intravenous Q1H PRN    [START ON 7/29/2021] ticagrelor (BRILINTA) tablet 90 mg  90 mg Oral Q12H JIHAN     propofol, 5-50 mcg/kg/min (Order-Specific)        Not on File    Objective   Vitals: Blood pressure 116/70, pulse 60, resp  rate (!) 0, SpO2 90 %  ,     There is no height or weight on file to calculate BMI ,     Systolic (49XXM), PRX:192 , Min:0 , CYQ:487     Diastolic (61COT), DYJ:78, Min:0, Max:119          No intake or output data in the 24 hours ending 07/28/21 1426  Weight (last 2 days)     None        Invasive Devices     Peripheral Intravenous Line            Peripheral IV 07/28/21 Right Antecubital <1 day    Peripheral IV 07/28/21 Right Forearm <1 day          Intraosseous Line            Intraosseous Line 07/28/21 Tibia <1 day          Line            Arterial Sheath 6 Fr  Right Femoral <1 day          Drain            NG/OG Tube Orogastric <1 day          Airway            ETT  Cuffed 7 5 mm <1 day                  Physical Exam   Physical Exam:   GEN: [facility] appears  Ill, intubated, sedated, nonverbal, no response  HEENT:  Pupils are dilated and fixed  NECK: supple, no carotid bruits or JVD  HEART: egular rhythm, normal S1 and S2,  Nomurmur, no clicks, gallops or rubs   LUNGS: clear to auscultation bilaterally;  Coarse breath sounds auscultable    ABDOMEN/GI: normal bowel sounds, soft, no tenderness,  Obese distention present  EXTREMITIES/Musculoskeltal: peripheral pulses normal; no clubbing, cyanosis,  NEURO: no focal motor findings   SKIN: normal without suspicious lesions on exposed skin      LABORATORY RESULTS:      CBC with diff:   Results from last 7 days   Lab Units 07/28/21  1400 07/28/21  1249 07/28/21  1247   WBC Thousand/uL  --  15 30*  --    HEMOGLOBIN g/dL  --  17 9*  --    I STAT HEMOGLOBIN g/dl 17 3*  --  18 4*   HEMATOCRIT %  --  55 7*  --    HEMATOCRIT, ISTAT % 51*  --  54*   MCV fL  --  87  --    PLATELETS Thousands/uL  --  156  --    MCH pg  --  28 1  --    MCHC g/dL  --  32 1  --    RDW %  --  14 4  --    MPV fL  --  11 4  --    NRBC AUTO /100 WBCs  --  0  --        CMP:  Results from last 7 days   Lab Units 07/28/21  1400 07/28/21  1247   CO2, I-STAT mmol/L 11* 11*   GLUCOSE, ISTAT mg/dl >600* >600*       BMP:  Results from last 7 days   Lab Units 07/28/21  1400 07/28/21  1247   CO2, I-STAT mmol/L 11* 11*   GLUCOSE, ISTAT mg/dl >600* >600*          No results found for: NTBNP                           Results from last 7 days   Lab Units 07/28/21  1250   INR  1 59*     Lipid Profile:   No results found for: CHOL  No results found for: HDL  No results found for: LDLCALC  No results found for: TRIG      Cardiac testing:   No results found for this or any previous visit  No results found for this or any previous visit  No valid procedures specified  No results found for this or any previous visit  Imaging: I have personally reviewed pertinent reports  XR chest 1 view portable    Result Date: 7/28/2021  Narrative: CHEST INDICATION:   Post intubation  Patient has suspected COVID-19  COMPARISON:  None EXAM PERFORMED/VIEWS:  XR CHEST PORTABLE FINDINGS: Cardiac silhouette partially obscured by shallow depth of inspiration, external medical apparatus and Rotation  Hazy groundglass opacities are seen in the lungs without evidence of a pneumothorax or significant effusion  Endotracheal tube tip is in the right mainstem bronchus  There is gaseous gastric distention     Impression: Endotracheal tube tip in the right mainstem bronchus  Please refer to follow-up chest x-ray report performed at 1:11 PM Workstation performed: [id number]     XR chest 1 view portable    Result Date: 7/28/2021  Narrative: CHEST INDICATION:   Assessment of orogastric tube  Patient has suspected COVID-19  COMPARISON:  Earlier study same date EXAM PERFORMED/VIEWS:  XR CHEST PORTABLE FINDINGS: Cardiomediastinal silhouette appears unremarkable  Endotracheal tube tip is in the proximal aspect of the right mainstem bronchus, with retraction approximately 3 to 4 cm recommended  Nasogastric tube extends below left hemidiaphragm and the stomach is no longer distended  Groundglass opacities are present in the lungs  Impression: 1   Endotracheal tube tip within the proximal right mainstem bronchus with retraction approximately 3 to 4 cm recommended 2  Orogastric tube extending below left hemidiaphragm  Stomach no longer distended 3  Bilateral groundglass opacities in the lungs  Pending results of COVID-19 testing, if confirmed, this may represent viral pneumonia  The examination demonstrates a significant  finding and was documented as such in [facility] for liaison and referring practitioner immediate notification  Workstation performed: [id number]       Thank you for allowing us to participate in this patient's care  Counseling / Coordination of Care  Total floor / unit time spent today 45 minutes  Greater than 50% of total time was spent with the patient and / or family counseling and / or coordination of care  A description of the counseling / coordination of care: Review of history, current assessment, development of a plan  Code Status: No Order    ** Please Note: Dragon 360 Dictation voice to text software may have been used in the creation of this document   **

## 2021-07-28 NOTE — ED PROVIDER NOTES
History  Chief Complaint   Patient presents with    Cardiac Arrest     HPI     Level 5 caveat due to patient's condition unresponsive, cardiac arrest     Patient arrived via EMS  Initial EMS call was for chest pain/back pain/lethargy  Per report, patient has symptoms for roughly 2 days in pre-hospital EKG concerning for acute MI  Blood sugar 200s per EMS  Per EMS, patient had a seizure as they were into the hospital and she became unresponsive  Upon arrival, patient unresponsive, oral airway in place, stomach contents around oral airway, patient pulseless    None       No past medical history on file  No past surgical history on file  No family history on file  I have reviewed and agree with the history as documented  No existing history information found  No existing history information found  Social History     Tobacco Use    Smoking status: Not on file   Substance Use Topics    Alcohol use: Not on file    Drug use: Not on file       Review of Systems   Unable to perform ROS: Acuity of condition       Physical Exam  Physical Exam  Vitals and nursing note reviewed  Constitutional:       Appearance: She is ill-appearing and toxic-appearing  Comments: Unresponsive, fixed and dilated pupils   HENT:      Right Ear: External ear normal       Left Ear: External ear normal       Mouth/Throat:      Comments: Oral airway in place, stomach contents filling airway and mouth  Eyes:      Comments: Fixed, dilated   Cardiovascular:      Comments: Pulseless  Abdominal:      General: There is no distension  Tenderness: There is no abdominal tenderness     Musculoskeletal:      Comments: Unresponsive   Neurological:      Comments: Unresponsive   Psychiatric:      Comments: Unable to assess         Vital Signs  ED Triage Vitals   Temp Pulse Respirations Blood Pressure SpO2   -- 07/28/21 1207 07/28/21 1207 07/28/21 1207 07/28/21 1218    (!) 0 (!) 0 (!) 0/0 97 %      Temp src Heart Rate Source Patient Position - Orthostatic VS BP Location FiO2 (%)   -- 07/28/21 1314 -- -- 07/28/21 1437    Monitor   100      Pain Score       --                  Vitals:    07/28/21 1318 07/28/21 1321 07/28/21 1330 07/28/21 1337   BP:   127/73 116/70   Pulse: 64 64 62 60         Visual Acuity      ED Medications  Medications   sodium chloride (PF) 0 9 % injection 3 mL (has no administration in time range)   ticagrelor (BRILINTA) tablet 180 mg (180 mg Oral Given 7/28/21 1313)     And   ticagrelor (BRILINTA) tablet 90 mg (has no administration in time range)   propofol (DIPRIVAN) 1000 mg in 100 mL infusion (premix) (has no administration in time range)   EPINEPHrine (ADRENALIN) injection (1 mg Intravenous Given 7/28/21 1209)   sodium bicarbonate 8 4 % injection (50 mEq Intravenous Given 7/28/21 1208)   calcium chloride 1 g/10 mL injection (1 g Intravenous Given 7/28/21 1209)   norepinephrine (LEVOPHED) 4 mg in sodium chloride 0 9 % 250 mL infusion (5 mcg/min  Restarted 7/28/21 1303)   heparin (porcine) injection 4,000 Units (4,000 Units Intravenous Given 7/28/21 1243)   sodium bicarbonate 8 4 % injection (50 mEq Intravenous Given 7/28/21 1410)   iohexol (OMNIPAQUE) 350 MG/ML injection (SINGLE-DOSE) (60 mL Intravenous Given 7/28/21 1413)   norepinephrine (LEVOPHED) 4 mg (STANDARD CONCENTRATION) IV in sodium chloride 0 9% 250 mL (12 mcg/min Intravenous Rate/Dose Change 7/28/21 1446)       Diagnostic Studies  Results Reviewed     Procedure Component Value Units Date/Time    Lactic acid, plasma [[id number]] Collected: 07/28/21 1443    Lab Status: In process Specimen: Blood Updated: 07/28/21 1443    Comprehensive metabolic panel [[id number]] Collected: 07/28/21 1443    Lab Status: In process Specimen: Blood Updated: 07/28/21 1443    Troponin I [[id number]] Collected: 07/28/21 1443    Lab Status:  In process Specimen: Blood Updated: 07/28/21 0304    Basic metabolic panel [[id number]] Collected: 07/28/21 1403    Lab Status: No result Specimen: Blood from Arm, Right Updated: 07/28/21 1405    Lipid panel [[id number]] Collected: 07/28/21 1403    Lab Status: No result Specimen: Blood from Arm, Right Updated: 07/28/21 1405    Magnesium [[id number]] Collected: 07/28/21 1403    Lab Status: No result Specimen: Blood from Arm, Right Updated: 07/28/21 1405    POCT Blood Gas (CG8+) [[id number]]  (Abnormal) Collected: 07/28/21 1400    Lab Status: Final result Specimen: Venous Updated: 07/28/21 1404     ph, Chaitanya ISTAT 6 655     pCO2, Chaitanya i-STAT 79 1 mm HG      pO2, Chaitanya i-STAT 86 0 mm HG      BE, i-STAT <-30 mmol/L      HCO3, Chaitanya i-STAT 8 8 mmol/L      CO2, i-STAT 11 mmol/L      O2 Sat, i-STAT 74 %      SODIUM, I-STAT 110 mmol/l      Potassium, i-STAT 7 0 mmol/L      Hct, i-STAT 51 %      Hgb, i-STAT 17 3 g/dl      Glucose, i-STAT >600 mg/dl      Specimen Type VENOUS    Novel Coronavirus [facility]a [facility] [[id number]] Collected: 07/28/21 1358    Lab Status:  In process Specimen: Nares from Nose Updated: 07/28/21 1403    Protime-INR [[id number]]  (Abnormal) Collected: 07/28/21 1250    Lab Status: Final result Specimen: Blood from Arm, Right Updated: 07/28/21 1338     Protime 19 0 seconds      INR 1 59    APTT [[id number]]  (Abnormal) Collected: 07/28/21 1250    Lab Status: Final result Specimen: Blood from Arm, Right Updated: 07/28/21 1338     PTT 75 seconds     CBC and differential [[id number]]  (Abnormal) Collected: 07/28/21 1249    Lab Status: Preliminary result Specimen: Blood from Arm, Right Updated: 07/28/21 1302     WBC 15 30 Thousand/uL      RBC 6 38 Million/uL      Hemoglobin 17 9 g/dL      Hematocrit 55 7 %      MCV 87 fL      MCH 28 1 pg      MCHC 32 1 g/dL      RDW 14 4 %      MPV 11 4 fL      Platelets 549 Thousands/uL      nRBC 0 /100 WBCs     POCT Blood Gas (CG8+) [[id number]]  (Abnormal) Collected: 07/28/21 1247    Lab Status: Final result Specimen: Arterial Updated: 07/28/21 1256     pH, Art i-STAT 6 735     pCO2, Art i-STAT 65 2 mm HG      pO2, ART i-STAT 51 0 mm HG      BE, i-STAT -29 mmol/L      HCO3, Art i-STAT 8 7 mmol/L      CO2, i-STAT 11 mmol/L      O2 Sat, i-STAT 48 %      SODIUM, I-STAT 109 mmol/l      Potassium, i-STAT 7 5 mmol/L      Hct, i-STAT 54 %      Hgb, i-STAT 18 4 g/dl      Glucose, i-STAT >600 mg/dl      POC FIO2 100 L      Specimen Type ARTERIAL    Troponin I [[id number]] Collected: 07/28/21 1251    Lab Status: No result Specimen: Blood from Arm, Right     CBC and differential [[id number]] Collected: 07/28/21 1250    Lab Status: No result Specimen: Blood from Arm, Right     Comprehensive metabolic panel [[id number]] Collected: 07/28/21 1250    Lab Status: No result Specimen: Blood from Arm, Right     Lipase [[id number]] Collected: 07/28/21 1250    Lab Status: No result Specimen: Blood from Arm, Right     NT-BNP PRO [[id number]] Collected: 07/28/21 1250    Lab Status: No result Specimen: Blood from Arm, Right     Protime-INR [[id number]] Collected: 07/28/21 1250    Lab Status: No result Specimen: Blood from Arm, Right                  XR chest 1 view portable   Final Result by Author Saul MD (07/28 1331)      1  Endotracheal tube tip within the proximal right mainstem bronchus with retraction approximately 3 to 4 cm recommended   2  Orogastric tube extending below left hemidiaphragm  Stomach no longer distended   3  Bilateral groundglass opacities in the lungs  Pending results of COVID-19 testing, if confirmed, this may represent viral pneumonia  The examination demonstrates a significant  finding and was documented as such in [facility] for liaison and referring practitioner immediate notification  Workstation performed: [id number]         XR chest 1 view portable   Final Result by Author Saul MD (07/28 1331)      Endotracheal tube tip in the right mainstem bronchus    Please refer to follow-up chest x-ray report performed at 1:11 PM                   Workstation performed: [id number] Procedures  ECG 12 Lead Documentation Only    Date/Time: 7/28/2021 12:16 PM  Performed by: Ajit Sen MD  Authorized by: Ajit Sen MD     Indications / Diagnosis:  Post cardiac arrest  ECG reviewed by me, the ED Provider: yes    Patient location:  ED  Interpretation:     Interpretation: abnormal    Rate:     ECG rate:  160    ECG rate assessment: tachycardic    Rhythm:     Rhythm: sinus tachycardia    Ectopy:     Ectopy: none    QRS:     QRS axis:  Normal  Conduction:     Conduction: normal    ST segments:     ST segments:  Abnormal    Elevation:  II, III and aVF  T waves:     T waves: normal    ECG 12 Lead Documentation Only    Date/Time: 7/28/2021 12:28 PM  Performed by: Ajit Sen MD  Authorized by: Ajit Sen MD     Indications / Diagnosis:  Repeat post arrest with improved heart rate  Patient location:  ED  Previous ECG:     Previous ECG:  Compared to current    Similarity:  Changes noted  Interpretation:     Interpretation: abnormal    Rate:     ECG rate:  91    ECG rate assessment: normal    Rhythm:     Rhythm: sinus rhythm    Ectopy:     Ectopy: none    QRS:     QRS axis:  Normal  Conduction:     Conduction: normal    ST segments:     ST segments:  Abnormal    Elevation:  II, III, aVF, V3, V4, V5 and V6  Intubation    Date/Time: 7/28/2021 2:58 PM  Performed by: Ajit Sen MD  Authorized by: Ajit Sen MD     Patient location:  ED  Other Assisting Provider: No    Consent:     Consent obtained:  Emergent situation  Pre-procedure details:     Patient status:  Unresponsive    Pretreatment medications:  None    Paralytics:  None  Indications:     Indications for intubation: airway protection    Procedure details:     Preoxygenation:  Bag valve mask    CPR in progress: yes      Intubation method:  Oral    Oral intubation technique:  Glidescope    Laryngoscope blade:   Mac 3    Tube size (mm):  7 5    Tube type:  Cuffed    Number of attempts:  1    Ventilation between attempts: no      Cricoid pressure: no      Tube visualized through cords: yes    Placement assessment:     ETT to lip:  25    Breath sounds:  Reduced on left    Placement verification: chest rise and CXR verification      CXR findings:  ETT in right main stem    Tube repositioned: yes    Post-procedure details:     Patient tolerance of procedure: Tolerated well, no immediate complications  CriticalCare Time  Performed by: Mike Nunn MD  Authorized by: Mike Nunn MD     Critical care provider statement:     Critical care time (minutes):  60    Critical care time was exclusive of:  Separately billable procedures and treating other patients and teaching time    Critical care was necessary to treat or prevent imminent or life-threatening deterioration of the following conditions:  Cardiac failure and respiratory failure  Comments:      CPR, intubation, mi alert, vasoactive medications, frequent re-evaluations, discussion with specialist and critical care team              ED Course                                           MDM  Number of Diagnoses or Management Options  STEMI (ST elevation myocardial infarction) [facility]): new and requires workup  Diagnosis management comments: EMS reports a patient was talking when they picked her up, had complained about back pain and chest pain but was also somewhat lethargic  Blood sugar 2 0s for EMS  Patient arrives in emergency department with oral airway in place  EMS reports patient had a brief seizure-like episode just prior to their arrival in the ED  Per EMS, she went unresponsive while they were wheeling her into the emergency department  Patient immediately assessed by myself  She was pulseless  She appeared to have bradyed down on EMS monitor  CPR was immediately initiated  She had gastric secretions in her oropharynx which were vigorously suctioned  She was given 2 rounds of epinephrine, calcium, bicarbonate  Please refer to CPR documentation  She was intubated without medications  Following 2nd round of epinephrine, rosc was achieved  Post CPR EKG concerning for STEMI  Subsequent EKG also consistent with inferior lateral ST elevation MI  Discussed with cardiologist, Dr Kat Ferguson who reviewed EKGs and agreed with MI alert  Patient briefly required Levophed for blood pressure support  ET tube was retracted per chest x-ray recommendations  Patient was given aspirin, Brilinta, heparin for treatment of suspected acute MI  Patient was taken to the cath lab  She was accompanied by critical care team and interventional cardiologist, Dr Kat Ferguson who pursued catheterization  Patient will be admitted to ICU team following cardiac cath  Amount and/or Complexity of Data Reviewed  Clinical lab tests: ordered and reviewed  Tests in the radiology section of CPT®: ordered and reviewed  Tests in the medicine section of CPT®: ordered and reviewed  Obtain history from someone other than the patient: yes  Discuss the patient with other providers: yes  Independent visualization of images, tracings, or specimens: yes    Risk of Complications, Morbidity, and/or Mortality  Presenting problems: high  Diagnostic procedures: high  Management options: high    Patient Progress  Patient progress: other (comment) (Critical condition at time of transfer to cath lab)      Disposition  Final diagnoses:   STEMI (ST elevation myocardial infarction) ([facility] [address] )     Time reflects when diagnosis was documented in both MDM as applicable and the Disposition within this note     Time User Action Codes Description Comment    7/28/2021 12:35 PM Erroll Sides Add [I21 3] STEMI (ST elevation myocardial infarction) [facility])       ED Disposition     None      Follow-up Information    None         There are no discharge medications for this patient  No discharge procedures on file      PDMP Review     None          ED Provider  Electronically Signed by           Cholo Teixeira MD  07/28/21 0977

## 2021-07-28 NOTE — SEPSIS NOTE
Sepsis Note   [facility] 49 y o  female MRN: [id number]  Unit/Bed#: ICU 05 Encounter: [id number]      qSOFA     Row Name 07/28/21 1337 07/28/21 1330 07/28/21 1315 07/28/21 1314 07/28/21 13:13:45    Altered mental status GCS < 15  --  --  --  --  --    Respiratory Rate > / =22  --  --  --  --  --    Systolic BP < / =951  0  0  0  0  0    Q Sofa Score  0  0  0  0  0    Row Name 07/28/21 1235 07/28/21 12:24:52 07/28/21 12:21:56 07/28/21 12:18:15 07/28/21 12:07:25    Altered mental status GCS < 15  --  --  --  --  --    Respiratory Rate > / =22  --  --  --  --  0    Systolic BP < / =021  0  0  0  0  1    Q Sofa Score  0  0  0  0  1        Initial Sepsis Screening     Row Name 07/28/21 1548                Is the patient's history suggestive of a new or worsening infection? (!) Yes (Proceed)  -MW        Suspected source of infection  suspect infection, source unknown covid positive  -MW        Are two or more of the following signs & symptoms of infection both present and new to the patient?  --        Indicate SIRS criteria  Tachycardia > 90 bpm;Leukocytosis (WBC > 66228 IJL)  -MW        If the answer is yes to both questions, suspicion of sepsis is present  --        If severe sepsis is present AND tissue hypoperfusion perists in the hour after fluid resuscitation or lactate > 4, the patient meets criteria for SEPTIC SHOCK  --        Are any of the following organ dysfunction criteria present within 6 hours of suspected infection and SIRS criteria that are NOT considered to be chronic conditions? --        Organ dysfunction  MAP < 65 mmHg; Lactate > 2 0 mmol/L;Acute respiratory failure (new need for invasive or non-invasive mechanical ventilation)  -MW        Date of presentation of severe sepsis  07/28/21  -MW        Time of presentation of severe sepsis  1500  -MW        Tissue hypoperfusion persists in the hour after crystalloid fluid administration, evidenced, by either:  SBP < 90 mm/Hg ( ___ mm/Hg in comment field)  -MW        Was hypotension present within one hour of the conclusion of crystalloid fluid administration?   Yes  -MW        Date of presentation of septic shock  07/28/21  -MW        Time of presentation of septic shock  1550  -MW          User Key  (r) = Recorded By, (t) = Taken By, (c) = Cosigned By    [address] Name Provider Type    LOI Dobbins PA-C Physician Assistant

## 2021-07-28 NOTE — PROGRESS NOTES
Right radial catheterization, A-line sewn in place    Findings:  Coronary arteries relatively normal in angiographic appearance, LVEF also normal     He has hypotensive    PH is 6 6, potassium is 7, another amp of bicarb was given

## 2021-07-28 NOTE — ASSESSMENT & PLAN NOTE
Recent Labs     07/28/21  1443 07/28/21  1518   K 7 4* 7 4*     Etiology: likely multifactorial in setting extreme elevations in blood glucose and cardiac arrest     Plan:  · Treated with multiple rounds bicarb, insulin albuterol nebulizer  · Q 4 BMP  · Monitor v/s closely

## 2021-07-28 NOTE — H&P
[address] 1965, 49 y o  female MRN: [id number]  Unit/Bed#: ICU 05 Encounter: [id number]  Primary Care Provider: Aminta Thacker PA-C   Date and time admitted to hospital: 7/28/2021 12:04 PM    * Cardiac arrest [facility])  Assessment & Plan  Pre-hospital call to emergency department reported rhythm strip en route concerning for inferior wall MI, cardiology/cath team contacted at that time by ED physician who agreed to proceed with cardiac cath upon EMS arrival to ED  Patient was pulseless and CPR was initiated by the ED team   Patient was not sedated or paralyzed for intubation and ROSC achieved after 2 rounds of CPR  At this time, pupils were reportedly fixed and dilated  She was also requiring blood pressure support with Levophed  Cardiac cath: final report pending - unofficial report in cath lab revealed clear/normal coronary arteries    Patient has NO had prior episodes of chest pain or similar complaints reported by patients family "she never has been sick"  No PCP  Past cardiac history: NONE per family and does not have PCP   Family history: none reported    Recent Labs     07/28/21  1443   TROPONINI 0 36*      Plan:    Continuous cardiopulm monitoring    BP support    Acidosis management    Labs:  o Repeat troponin q3h x 3 or until peak, Lipid panel, HbA1C, TSH   Meds:  o Brilinta 90mg  o Levophed and Vasopressin for BP support  o ABX per covid protocol    Cardiology onboard    Elevated LFTs  Assessment & Plan  Recent Labs     07/28/21  1443 07/28/21  1518   AST 1,369* 1,277*   * 326*   ALKPHOS 236* 207*   TBILI 0 45 0 53     Likely in the setting of shocked liver (cardiac arrest, septic shock, covid+)  EtOH: pending  Drug use: denied by family  Acetaminophen: pending    Imaging  · CT Abd/Pev: will consider when appropriate    Plan:  · Continue to trend LFTs  · Hepatitis panel      Hyperphosphatemia  Assessment & Plan  Will repeat   Workup ongoing  · PTH sent   · CK pending     Hyperkalemia  Assessment & Plan  Recent Labs     07/28/21  1443 07/28/21  1518   K 7 4* 7 4*     Etiology: likely multifactorial in setting extreme elevations in blood glucose and cardiac arrest     Plan:  · Treated with multiple rounds bicarb, insulin albuterol nebulizer  · Q 4 BMP  · Monitor v/s closely       Septic shock [facility])  Assessment & Plan  Patient noted to be hypothermic, in cardiac arrest on ED arrival    Suspected source: COVID +, glucose of 913, workup ongoing     Recent Labs     07/28/21  1249 07/28/21  1518   WBC 15 30* 8 40     Recent Labs     07/28/21  1443 07/28/21  1518   LACTICACID 16 4* 14 7*       /70   Pulse 69   Temp (!) 90 1 °F (32 3 °C) (Probe)   Resp (!) 26   Ht 5' 6" (1 676 m)   Wt 83 3 kg (183 lb 10 3 oz)   SpO2 100%   BMI 29 64 kg/m²     XR chest 1 view portable    Result Date: 7/28/2021  Impression: Endotracheal tube tip in the right mainstem bronchus  Please refer to follow-up chest x-ray report performed at 1:11 PM Workstation performed: [id number]     XR chest 1 view portable    Result Date: 7/28/2021  Impression: 1  Endotracheal tube tip within the proximal right mainstem bronchus with retraction approximately 3 to 4 cm recommended 2  Orogastric tube extending below left hemidiaphragm  Stomach no longer distended 3  Bilateral groundglass opacities in the lungs  Pending results of COVID-19 testing, if confirmed, this may represent viral pneumonia  The examination demonstrates a significant  finding and was documented as such in [facility] for liaison and referring practitioner immediate notification   Workstation performed: [id number]      Plan:   Labs:  o Cultures: pending   o Procal: pending    Antibiotics: Doxy and Ceftriaxone    IV fluid hydration    Pneumonia due to COVID-19 virus  Assessment & Plan  Lab Results   Component Value Date    SARSCOV2 Positive (A) 07/28/2021       Patient presenting with symptoms of COVID-19 SYMPTOMS: Cardiac arrest, acute hypoxic resp failure    Workup:  · Imaging:  XR chest 1 view portable    Result Date: 7/28/2021  Impression: Endotracheal tube tip in the right mainstem bronchus  Please refer to follow-up chest x-ray report performed at 1:11 PM Workstation performed: [id number]     XR chest 1 view portable    Result Date: 7/28/2021  Impression: 1  Endotracheal tube tip within the proximal right mainstem bronchus with retraction approximately 3 to 4 cm recommended 2  Orogastric tube extending below left hemidiaphragm  Stomach no longer distended 3  Bilateral groundglass opacities in the lungs  Pending results of COVID-19 testing, if confirmed, this may represent viral pneumonia  The examination demonstrates a significant  finding and was documented as such in [facility] for liaison and referring practitioner immediate notification  Workstation performed: [id number]     · Labs:  Ferritin, CRP, D-dimer, procalcitonin pending  · Oxygen:  Mechanical Ventilation Saturating 90% on ACVC 24/400/9/100    COVID Stage: SEVERE    Plan:  · Trend Procal Consider d/c Abx if negative x 2  · Ceftriaxone, Doxycycline  · Famotidine, Atorvastatin, Vitamin D3, Vitamin C, Zinc x 7 days then multivitamin qd  · Decadron 0 1mg/kg  · Hold on Actemra at this time pending further labs  · Trend labs    Acute respiratory failure with hypoxia and hypercapnia ([facility] [address] )  Assessment & Plan  Intubated secondary to cardiac arrest and COVID pneumonia  See COVID for detailed plan  ABG:  Results from last 7 days   Lab Units 07/28/21  1540   PH ART  6 876*   PCO2 ART mm Hg 56 2*   PO2 ART mm Hg 66 2*   HCO3 ART mmol/L 10 2*   BASE EXC ART mmol/L -23 4   ABG SOURCE  Line, Arterial     Lab Results   Component Value Date    SARSCOV2 Positive (A) 07/28/2021   phet  · Imaging:  XR chest 1 view portable    Result Date: 7/28/2021  Impression: Endotracheal tube tip in the right mainstem bronchus    Please refer to follow-up chest x-ray report performed at 1:11 PM Workstation performed: [id number]     XR chest 1 view portable    Result Date: 7/28/2021  Impression: 1  Endotracheal tube tip within the proximal right mainstem bronchus with retraction approximately 3 to 4 cm recommended 2  Orogastric tube extending below left hemidiaphragm  Stomach no longer distended 3  Bilateral groundglass opacities in the lungs  Pending results of COVID-19 testing, if confirmed, this may represent viral pneumonia  The examination demonstrates a significant  finding and was documented as such in [facility] for liaison and referring practitioner immediate notification  Workstation performed: [id number]   ? Recent Labs     07/28/21  1249 07/28/21  1518   WBC 15 30* 8 40     No results for input(s): PROCALCITONI in the last 72 hours  Plan:  · Labs:  ? COVID labs   ? Procalcitonin today and tomorrow a m   ? CBC with Diff Tomorrow AM      Blood glucose elevated  Assessment & Plan  Results from last 7 days   Lab Units 07/28/21  1518 07/28/21  1443 07/28/21  1400 07/28/21  1247   SODIUM mmol/L 119* 112*  --   --    POTASSIUM mmol/L 7 4* 7 4*  --   --    CHLORIDE mmol/L 81* 75*  --   --    CO2 mmol/L 12* 8*  --   --    CO2, I-STAT mmol/L  --   --  11* 11*   ANION GAP mmol/L 26* 29*  --   --    BUN mg/dL 68* 67*  --   --    CREATININE mg/dL 3 57* 3 61*  --   --    CALCIUM mg/dL 10 0 10 4*  --   --    ALK PHOS U/L 207* 236*  --   --    ALT U/L 326* 420*  --   --    AST U/L 1,277* 1,369*  --   --    GLUCOSE, ISTAT mg/dl  --   --  >600* >600*     UA pending    Workup:  · Glucose >600 on arrival  · Beta-hydroxybutyrate: pending  · UA: pending    No results for input(s): PHVEN, JUV1SHI, PO2VEN, OTY3AMZ in the last 72 hours    Recent Labs     07/28/21  1443 07/28/21  1518   AGAP 29* 26*   K 7 4* 7 4*     Plan:  · Started on insulin drip protocol with regular glucose checks q 1 hours  · Bicarb infusion  · Goal glucose range: 150-200  · Fluid hydration  · Monitor K, Mg, Phos levels q6h, replete PRN  · K>4 5, Mg >2    Toxic metabolic encephalopathy  Assessment & Plan  Questionable seizure activity, likely related to severe metabolic derangements  Workup ongoing  Elevated LA  · Trend LA  · Propofol for sedation for ventilator   · Consider head CT when pt stabilized  · Consider neuro consult    High anion gap metabolic acidosis  Assessment & Plan  Multifactorial origin in setting of critical illness and high priority concurrent problems  Workup and investigations on going  Results from last 7 days   Lab Units 07/28/21  1518 07/28/21  1443 07/28/21  1400 07/28/21  1247   SODIUM mmol/L 119* 112*  --   --    POTASSIUM mmol/L 7 4* 7 4*  --   --    CHLORIDE mmol/L 81* 75*  --   --    CO2 mmol/L 12* 8*  --   --    CO2, I-STAT mmol/L  --   --  11* 11*   ANION GAP mmol/L 26* 29*  --   --    BUN mg/dL 68* 67*  --   --    CREATININE mg/dL 3 57* 3 61*  --   --    CALCIUM mg/dL 10 0 10 4*  --   --    ALK PHOS U/L 207* 236*  --   --    ALT U/L 326* 420*  --   --    AST U/L 1,277* 1,369*  --   --    GLUCOSE, ISTAT mg/dl  --   --  >600* >600*     No results for input(s): PHVEN, JMB8BCE, PO2VEN, GFV9YFB in the last 72 hours  Recent Labs     07/28/21  1443 07/28/21  1518   AGAP 29* 26*   K 7 4* 7 4*     Plan:  · Mechanical ventilation  · Started on insulin drip  · Bicarb drip  · Goal glucose range: 150-200  · Fluid hydration  · ABGs q4hr      -------------------------------------------------------------------------------------------------------------  Chief Complaint: Cardiac arrest with confirmed + COVID      History of Present Illness   HX and PE limited by: Cardiac arrest, s/p intubation    Kelsey Hauser is a 49 y o  female with confirmed + COVID, unvaccinated, w/ reportedly no pmhx (per family) who presented to emergency department in cardiac arrest       Patient brought by EMS for reported that they were called to have by patient's family as she was experiencing chest pain back pain and lethargy  At that time family reported patient having symptoms for 1-2 days  Of note, patient had to visit to providers, 7/24 to urgent care where [facility] and [facility] weakness - unable to view full encounter details, she was given Robaxin  Per EMS patient conversant though lethargic with fingerstick glucose in the 200s  Pre-hospital call to emergency department reported rhythm strip en route concerning for inferior wall MI, cardiology/cath team contacted at that time by ED physician who agreed to proceed with cardiac cath upon EMS arrival to ED  When EMS arrived, as patient was being wheeled into ED, noted to have seizure-like event and became unresponsive, oral airway was in place will stomach contents were present  Patient was pulseless and CPR was initiated by the ED team  Patient was not sedated or paralyzed for intubation and ROSC achieved after 2 rounds of CPR  At this time, pupils were reportedly fixed and dilated  She was also requiring blood pressure support with Levophed  Initial venous gas on ed arrival showed pH 6 7 / 65 co2 / o2 51 / bicarb 6 7 w/ POCT glucose of >600 CBC showed wbc 15 and hgb of 17 no other labs were able to be obtained at this time due to access  Patient was then transported to the cath lab which revealed normal coronary arteries  Patient to be admitted to intensive care unit for further management and monitoring  Patients daughter and  provided with ongoing updates  History obtained from chart review and patients daughter who is also COVID positive   -------------------------------------------------------------------------------------------------------------  Dispo:  Admit to Critical Care     Code Status: Level 1 - Full Code  --------------------------------------------------------------------------------------------------------------  Review of Systems   Unable to perform ROS: Intubated     Review of systems was unable to be performed secondary to intubated, AMS    Physical Exam  Vitals and nursing note reviewed  Constitutional:       Appearance: She is ill-appearing and toxic-appearing  She is not diaphoretic  Comments: Intubated   HENT:      Head: Normocephalic and atraumatic  Right Ear: External ear normal       Left Ear: External ear normal       Mouth/Throat:      Comments: Coffee ground emesis from OG tube  Eyes:      General: No scleral icterus  Comments: Sluggish pupils but reactive b/l   Neck:      Comments: No JVD  Cardiovascular:      Rate and Rhythm: Normal rate  Heart sounds: Normal heart sounds  Comments: Heart sounds distant, faint no murmur   Extremities cool to touch but pulses present 2+ b/l upper and lower    Pulmonary:      Breath sounds: Rhonchi present  Comments: Intubated , bilateral air entry   Coarse breath sounds b/l   Abdominal:      General: Bowel sounds are normal  There is no distension  Palpations: Abdomen is soft  Musculoskeletal:      Right lower leg: No edema  Left lower leg: No edema  Skin:     General: Skin is dry  Coloration: Skin is pale  Skin is not jaundiced  Neurological:      Comments: Non purposeful fluttering of eyes  Pupils reactive   Does not withdrawal to pain    Psychiatric:      Comments: Unable to assess       --------------------------------------------------------------------------------------------------------------  Vitals:   Vitals:    07/28/21 1550 07/28/21 1600 07/28/21 1615 07/28/21 1719   BP:       Pulse:  69 69    Resp:  (!) 26 (!) 26    Temp:   (!) 90 1 °F (32 3 °C)    TempSrc:   Probe    SpO2: 94% 100% 100% (!) 88%   Weight:       Height:         Temp  Min: 90 1 °F (32 3 °C)  Max: 90 1 °F (32 3 °C)  IBW (Ideal Body Weight): 59 3 kg  Height: 5' 6" (167 6 cm)  Body mass index is 29 64 kg/m²      Laboratory and Diagnostics:  Results from last 7 days   Lab Units 07/28/21  1518 07/28/21  1400 07/28/21  1249 07/28/21  1247   WBC Thousand/uL 8 40  --  15 30*  --    HEMOGLOBIN g/dL 14 1  --  17 9*  --    I STAT HEMOGLOBIN g/dl  --  17 3*  --  18 4*   HEMATOCRIT % 43 2  --  55 7*  --    HEMATOCRIT, ISTAT %  --  51*  --  54*   PLATELETS Thousands/uL 186  --  156  --    BANDS PCT %  --   --  9*  --    MONO PCT %  --   --  6  --      Results from last 7 days   Lab Units 07/28/21  1518 07/28/21  1443 07/28/21  1400 07/28/21  1247   SODIUM mmol/L 119* 112*  --   --    POTASSIUM mmol/L 7 4* 7 4*  --   --    CHLORIDE mmol/L 81* 75*  --   --    CO2 mmol/L 12* 8*  --   --    CO2, I-STAT mmol/L  --   --  11* 11*   ANION GAP mmol/L 26* 29*  --   --    BUN mg/dL 68* 67*  --   --    CREATININE mg/dL 3 57* 3 61*  --   --    CALCIUM mg/dL 10 0 10 4*  --   --    GLUCOSE RANDOM mg/dL 857* 913*  --   --    ALT U/L 326* 420*  --   --    AST U/L 1,277* 1,369*  --   --    ALK PHOS U/L 207* 236*  --   --    ALBUMIN g/dL 1 2* 1 7*  --   --    TOTAL BILIRUBIN mg/dL 0 53 0 45  --   --      Results from last 7 days   Lab Units 07/28/21  1518   MAGNESIUM mg/dL 4 0*   PHOSPHORUS mg/dL >18 0*      Results from last 7 days   Lab Units 07/28/21  1518 07/28/21  1250   INR  2 01* 1 59*   PTT seconds  --  75*      Results from last 7 days   Lab Units 07/28/21  1443   TROPONIN I ng/mL 0 36*     Results from last 7 days   Lab Units 07/28/21  1518 07/28/21  1443   LACTIC ACID mmol/L 14 7* 16 4*     ABG:  Results from last 7 days   Lab Units 07/28/21  1540   PH ART  6 876*   PCO2 ART mm Hg 56 2*   PO2 ART mm Hg 66 2*   HCO3 ART mmol/L 10 2*   BASE EXC ART mmol/L -23 4   ABG SOURCE  Line, Arterial     VBG:  Results from last 7 days   Lab Units 07/28/21  1540   ABG SOURCE  Line, Arterial           Micro:        EKG: inferior STEMI on admission  Imaging: I have personally reviewed pertinent reports  and I have personally reviewed pertinent films in PACS      Historical Information   No past medical history on file    No past surgical history on file   Social History   Social History     Substance and Sexual Activity   Alcohol Use Not on file     Social History     Substance and Sexual Activity   Drug Use Not on file     Social History     Tobacco Use   Smoking Status Not on file     Family History:   No family history on file    I have reviewed this patient's family history and commented on sigificant items within the HPI      Medications:  Current Facility-Administered Medications   Medication Dose Route Frequency    sterile water injection **ADS Override Pull**        ascorbic acid (VITAMIN C) tablet 1,000 mg  1,000 mg Per NG Tube Q12H [address]    atorvastatin (LIPITOR) tablet 40 mg  40 mg Per NG Tube HS    cefTRIAXone (ROCEPHIN) 1,000 mg in dextrose 5 % 50 mL IVPB  1,000 mg Intravenous Q24H    cholecalciferol (VITAMIN D3) tablet 2,000 Units  2,000 Units Per NG Tube Daily    dexamethasone (PF) (DECADRON) injection 8 3 mg  0 1 mg/kg Intravenous Q12H    doxycycline (VIBRAMYCIN) 100 mg in sodium chloride 0 9 % 100 mL IVPB  100 mg Intravenous Q12H    [START ON 7/29/2021] famotidine (PEPCID) injection 20 mg  20 mg Intravenous Q24H [address]    insulin regular (HumuLIN R,NovoLIN R) 1 Units/mL in sodium chloride 0 9 % 100 mL infusion  0 3-21 Units/hr Intravenous Titrated    levalbuterol (XOPENEX) inhalation solution 1 25 mg  1 25 mg Nebulization TID    multi-electrolyte (ISOLYTE-S PH 7 4) bolus 1,000 mL  1,000 mL Intravenous Once    multi-electrolyte (ISOLYTE-S PH 7 4) bolus 499 mL  499 mL Intravenous Once    zinc sulfate (ZINCATE) capsule 220 mg  220 mg Per NG Tube Daily    Followed by   Richard Solomon ON 8/4/2021] multivitamin with iron-minerals liquid 15 mL  15 mL Per NG Tube Daily    NOREPINEPHRINE 4 MG  ML NSS (CMPD ORDER) infusion  1-30 mcg/min Intravenous Titrated    pantoprazole (PROTONIX) injection 40 mg  40 mg Intravenous Q12H [address]    phentolamine injection 5 mg  5 mg Infiltration Once    propofol (DIPRIVAN) 1000 mg in 100 mL infusion (premix) 5-50 mcg/kg/min (Order-Specific) Intravenous Titrated    sodium bicarbonate 150 mEq in dextrose 5 % 1,000 mL infusion  150 mL/hr Intravenous Continuous    sodium chloride (PF) 0 9 % injection 3 mL  3 mL Intravenous Q1H PRN    sodium chloride 3 % inhalation solution 4 mL  4 mL Nebulization TID    vasopressin (PITRESSIN) 20 Units in sodium chloride 0 9 % 100 mL infusion  0 04 Units/min Intravenous Continuous    vecuronium (NORCURON) injection 10 mg  10 mg Intravenous Once     Home medications:  None     Allergies:  Not on File    ------------------------------------------------------------------------------------------------------------  Advance Directive and Living Will:      Power of :    POLST:    ------------------------------------------------------------------------------------------------------------  Anticipated Length of Stay is > 2 midnights    Care Time Delivered:   Upon my evaluation, this patient had a high probability of imminent or life-threatening deterioration due to post cardiac arrest, STEMI, acute hypoxic resp failure 2/2 to covid 19 pneumonia, which required my direct attention, intervention, and personal management  I have personally provided 45 minutes (1330 to 0215) of critical care time, exclusive of procedures, teaching, family meetings, and any prior time recorded by providers other than myself  Viral Zuniga MD    Portions of the record may have been created with voice recognition software  Occasional wrong word or "sound a like" substitutions may have occurred due to the inherent limitations of voice recognition software    Read the chart carefully and recognize, using context, where substitutions have occurred

## 2021-07-28 NOTE — ASSESSMENT & PLAN NOTE
Pre-hospital call to emergency department reported rhythm strip en route concerning for inferior wall MI, cardiology/cath team contacted at that time by ED physician who agreed to proceed with cardiac cath upon EMS arrival to ED  Patient was pulseless and CPR was initiated by the ED team   Patient was not sedated or paralyzed for intubation and ROSC achieved after 2 rounds of CPR  At this time, pupils were reportedly fixed and dilated  She was also requiring blood pressure support with Levophed  Cardiac cath: final report pending - unofficial report in cath lab revealed clear/normal coronary arteries    Patient has NO had prior episodes of chest pain or similar complaints reported by patients family "she never has been sick"   No PCP  Past cardiac history: NONE per family and does not have PCP   Family history: none reported    Recent Labs     07/28/21  1443   TROPONINI 0 36*      Plan:    Continuous cardiopulm monitoring    BP support    Acidosis management    Labs:  o Repeat troponin q3h x 3 or until peak, Lipid panel, HbA1C, TSH   Meds:  o Brilinta 90mg  o Levophed and Vasopressin for BP support  o ABX per covid protocol    Cardiology onboard

## 2021-07-28 NOTE — ASSESSMENT & PLAN NOTE
Patient noted to be hypothermic, in cardiac arrest on ED arrival    Suspected source: COVID +, glucose of 913, workup ongoing     Recent Labs     07/28/21  1249 07/28/21  1518   WBC 15 30* 8 40     Recent Labs     07/28/21  1443 07/28/21  1518   LACTICACID 16 4* 14 7*       /70   Pulse 69   Temp (!) 90 1 °F (32 3 °C) (Probe)   Resp (!) 26   Ht 5' 6" (1 676 m)   Wt 83 3 kg (183 lb 10 3 oz)   SpO2 100%   BMI 29 64 kg/m²     XR chest 1 view portable    Result Date: 7/28/2021  Impression: Endotracheal tube tip in the right mainstem bronchus  Please refer to follow-up chest x-ray report performed at 1:11 PM Workstation performed: [id number]     XR chest 1 view portable    Result Date: 7/28/2021  Impression: 1  Endotracheal tube tip within the proximal right mainstem bronchus with retraction approximately 3 to 4 cm recommended 2  Orogastric tube extending below left hemidiaphragm  Stomach no longer distended 3  Bilateral groundglass opacities in the lungs  Pending results of COVID-19 testing, if confirmed, this may represent viral pneumonia  The examination demonstrates a significant  finding and was documented as such in [facility] for liaison and referring practitioner immediate notification   Workstation performed: [id number]      Plan:   Labs:  o Cultures: pending   o Procal: pending    Antibiotics: Doxy and Ceftriaxone    IV fluid hydration

## 2021-07-28 NOTE — QUICK NOTE
Came to see patient during a code blue    PEA arrest, reviewed her ECG/telemetry strips-does not know his QRS complexes, mildly bradycardic but lost blood pressure  No ventricular tachycardia or ventricular fibrillation  She remains hyperkalemic and acidotic-most recent potassium of 7 4 pH of 6 87, bicarbonate of 10    Reviewed echocardiogram, shows normal LV function-hyperdynamic  RV function is normal to low normal   No evidence of pericardial tamponade on echo  Utility of a pericardiocentesis is low  Has a moderate pericardial effusion,  respirophasic variation in left-sided -mitral valve inflow velocities is normal (does not suggest tamponade), there is exaggerated variation in right-sided  tricuspid valve inflow velocities (but she is also on positive pressure ventilation on a ventilator)  Etiology of her PEA arrest is acidosis and hyperkalemia  No evidence of pericardial tamponade on echo  Utility of a pericardiocentesis is low  Prognosis is very poor and even if her hemodynamics recover, likelihood of neurologic recovery is very poor    Discussed with Dr Anna

## 2021-07-28 NOTE — QUICK NOTE
Called and updated patient's  and daughter  Aware that patient is highly critical with life threatening metabolic derangements and requiring pressors for blood pressure support  I advised that patient it is unclear exactly the duration of patient's hypoxia which, may in turn, have caused an irreversible anoxic brain injury  Family appears to understand  Unfortunately, the entire family is positive for COVID and are unable to visit patient at bedside however I offered them to set up face time in order to get face to face time with the patient  They are aware that patient is intubated and currently not communicating or responding to painful stimuli  Addressed code status  At this point,  and daughter Joelle Eaton would want CPR if patient were to have another cardiac arrest, with the understanding that this may cause worsening encephalopathy and end organ dysfunction

## 2021-07-28 NOTE — PROCEDURES
Central Line    Date/Time: 7/28/2021 3:17 PM  Performed by: Petra Méndez DO  Authorized by: Petra Méndez DO     Patient location:  Bedside  Consent:     Consent obtained:  Emergent situation  Universal protocol:     Procedure explained and questions answered to patient or proxy's satisfaction: no (Emergent)      Relevant documents present and verified: yes      Test results available and properly labeled: yes      Radiology Images displayed and confirmed  If images not available, report reviewed: yes      Site/side marked: yes      Immediately prior to procedure, a time out was called: yes      Patient identity confirmed:  Arm band and provided demographic data  Pre-procedure details:     Hand hygiene: Hand hygiene performed prior to insertion      Sterile barrier technique: All elements of maximal sterile technique followed      Skin preparation:  2% chlorhexidine    Skin preparation agent: Skin preparation agent completely dried prior to procedure    Indications:     Central line indications: medications requiring central line and no peripheral vascular access      Site selection rationale:  Emergent, needed immediate access  Anesthesia (see MAR for exact dosages): Anesthesia method:  None  Procedure details:     Location:  Left femoral    Vessel type: vein      Laterality:  Left    Approach: percutaneous technique used      Patient position:  Flat    Catheter type:  Triple lumen    Landmarks identified: yes      Ultrasound guidance: yes      Ultrasound image availability:  Not saved    Sterile ultrasound techniques: Sterile gel and sterile probe covers were used      Number of attempts:  1    Successful placement: yes    Post-procedure details:     Post-procedure:  Dressing applied and line sutured    Assessment:  Blood return through all ports    Post-procedure complications: none      Patient tolerance of procedure:   Tolerated well, no immediate complications

## 2021-07-28 NOTE — ASSESSMENT & PLAN NOTE
Intubated secondary to cardiac arrest and COVID pneumonia  See COVID for detailed plan  ABG:  Results from last 7 days   Lab Units 07/28/21  1540   PH ART  6 876*   PCO2 ART mm Hg 56 2*   PO2 ART mm Hg 66 2*   HCO3 ART mmol/L 10 2*   BASE EXC ART mmol/L -23 4   ABG SOURCE  Line, Arterial     Lab Results   Component Value Date    SARSCOV2 Positive (A) 07/28/2021   phet  · Imaging:  XR chest 1 view portable    Result Date: 7/28/2021  Impression: Endotracheal tube tip in the right mainstem bronchus  Please refer to follow-up chest x-ray report performed at 1:11 PM Workstation performed: [id number]     XR chest 1 view portable    Result Date: 7/28/2021  Impression: 1  Endotracheal tube tip within the proximal right mainstem bronchus with retraction approximately 3 to 4 cm recommended 2  Orogastric tube extending below left hemidiaphragm  Stomach no longer distended 3  Bilateral groundglass opacities in the lungs  Pending results of COVID-19 testing, if confirmed, this may represent viral pneumonia  The examination demonstrates a significant  finding and was documented as such in [facility] for liaison and referring practitioner immediate notification  Workstation performed: [id number]   ? Recent Labs     07/28/21  1249 07/28/21  1518   WBC 15 30* 8 40     No results for input(s): PROCALCITONI in the last 72 hours  Plan:  · Labs:  ? COVID labs   ?  Procalcitonin today and tomorrow a m   ? CBC with Diff Tomorrow AM

## 2021-07-28 NOTE — ASSESSMENT & PLAN NOTE
Results from last 7 days   Lab Units 07/28/21  1518 07/28/21  1443 07/28/21  1400 07/28/21  1247   SODIUM mmol/L 119* 112*  --   --    POTASSIUM mmol/L 7 4* 7 4*  --   --    CHLORIDE mmol/L 81* 75*  --   --    CO2 mmol/L 12* 8*  --   --    CO2, I-STAT mmol/L  --   --  11* 11*   ANION GAP mmol/L 26* 29*  --   --    BUN mg/dL 68* 67*  --   --    CREATININE mg/dL 3 57* 3 61*  --   --    CALCIUM mg/dL 10 0 10 4*  --   --    ALK PHOS U/L 207* 236*  --   --    ALT U/L 326* 420*  --   --    AST U/L 1,277* 1,369*  --   --    GLUCOSE, ISTAT mg/dl  --   --  >600* >600*     UA pending    Workup:  · Glucose >600 on arrival  · Beta-hydroxybutyrate: pending  · UA: pending    No results for input(s): PHVEN, JUW7JRI, PO2VEN, YRU7NQD in the last 72 hours    Recent Labs     07/28/21  1443 07/28/21  1518   AGAP 29* 26*   K 7 4* 7 4*     Plan:  · Started on insulin drip protocol with regular glucose checks q 1 hours  · Bicarb infusion  · Goal glucose range: 150-200  · Fluid hydration  · Monitor K, Mg, Phos levels q6h, replete PRN  · K>4 5, Mg >2

## 2021-07-28 NOTE — ASSESSMENT & PLAN NOTE
Lab Results   Component Value Date    SARSCOV2 Positive (A) 07/28/2021       Patient presenting with symptoms of COVID-19 SYMPTOMS: Cardiac arrest, acute hypoxic resp failure    Workup:  · Imaging:  XR chest 1 view portable    Result Date: 7/28/2021  Impression: Endotracheal tube tip in the right mainstem bronchus  Please refer to follow-up chest x-ray report performed at 1:11 PM Workstation performed: [id number]     XR chest 1 view portable    Result Date: 7/28/2021  Impression: 1  Endotracheal tube tip within the proximal right mainstem bronchus with retraction approximately 3 to 4 cm recommended 2  Orogastric tube extending below left hemidiaphragm  Stomach no longer distended 3  Bilateral groundglass opacities in the lungs  Pending results of COVID-19 testing, if confirmed, this may represent viral pneumonia  The examination demonstrates a significant  finding and was documented as such in [facility] for liaison and referring practitioner immediate notification   Workstation performed: [id number]     · Labs:  Ferritin, CRP, D-dimer, procalcitonin pending  · Oxygen:  Mechanical Ventilation Saturating 90% on ACVC 24/400/9/100    COVID Stage: SEVERE    Plan:  · Trend Procal Consider d/c Abx if negative x 2  · Ceftriaxone, Doxycycline  · Famotidine, Atorvastatin, Vitamin D3, Vitamin C, Zinc x 7 days then multivitamin qd  · Decadron 0 1mg/kg  · Hold on Actemra at this time pending further labs  · Trend labs

## 2021-07-28 NOTE — DEATH NOTE
INPATIENT DEATH NOTE  [facility] 49 y o  female MRN: [id number]  Unit/Bed#: ICU 05 Encounter: [id number]    Date, Time and Cause of Death    Date of Death: 21  Time of Death:  6:51 PM  Preliminary Cause of Death: Cardiac arrest  Entered by: Anita Stacy MD[AW1 1]     Attribution     AW1 1 Anita Stacy MD 21 19:21           Patient's Information  Pronounced by: TONYA Branham  Did the patient's death occur in the ED?: No  Did the patient's death occur in the OR?: No  Did the patient's death occur less than 10 days post-op?: No  Did the patient's death occur within 24 hours of admission?: Yes  Was code status DNR at the time of death?: Yes    PHYSICAL EXAM:  Unresponsive to noxious stimuli, Spontaneous respirations absent, Breath sounds absent, Carotid pulse absent, Heart sounds absent, Pupillary light reflex absent and Corneal blink reflex absent    Medical Examiner notification criteria:  Patient  within 24 hours of arrival to hospital   Medical Examiner's office notified?:  Yes   Medical Examiner accepted case?:  pending  Name of Medical Examiner: pending    Family Notification  Was the family notified?: Yes  Date Notified: 21  Time Notified: 1034  Notified by:  notified by Dr Conor Mckeon ;  Sister notified by resident Dr Danilo Langston  Name of Family Notified of Death: Farrel Row () sister ([facility])   Relationship to Patient: Spouse, Sister  Family Notification Route: Telephone  Was the family told to contact a  home?: Yes  Name of  Home[de-identified] Unable to provide at this time - given callback phone number, ICU number    Autopsy Options:  Medical examiner notified, pending family call back after notification of death    Primary Service Attending Physician notified?:  yes - Attending:  Frankie Ramirez, DO present in ICU at time of death    Physician/Resident responsible for completing Discharge Summary:  Anita Stacy MD

## 2021-07-28 NOTE — NURSING NOTE
AP reached out to pt , he decided to make her DNR  Pt coded again, requiring another amp of bicarb, epi, and 10 of EPI gtt, prop d/c  All verbal orders per DIANE Bejarano

## 2021-07-28 NOTE — ASSESSMENT & PLAN NOTE
Questionable seizure activity, likely related to severe metabolic derangements  Workup ongoing  Elevated LA      · Trend LA  · Propofol for sedation for ventilator   · Consider head CT when pt stabilized  · Consider neuro consult

## 2021-07-28 NOTE — ASSESSMENT & PLAN NOTE
Multifactorial origin in setting of critical illness and high priority concurrent problems  Workup and investigations on going  Results from last 7 days   Lab Units 07/28/21  1518 07/28/21  1443 07/28/21  1400 07/28/21  1247   SODIUM mmol/L 119* 112*  --   --    POTASSIUM mmol/L 7 4* 7 4*  --   --    CHLORIDE mmol/L 81* 75*  --   --    CO2 mmol/L 12* 8*  --   --    CO2, I-STAT mmol/L  --   --  11* 11*   ANION GAP mmol/L 26* 29*  --   --    BUN mg/dL 68* 67*  --   --    CREATININE mg/dL 3 57* 3 61*  --   --    CALCIUM mg/dL 10 0 10 4*  --   --    ALK PHOS U/L 207* 236*  --   --    ALT U/L 326* 420*  --   --    AST U/L 1,277* 1,369*  --   --    GLUCOSE, ISTAT mg/dl  --   --  >600* >600*     No results for input(s): PHVEN, IJD6GCK, PO2VEN, KPD3BYU in the last 72 hours    Recent Labs     07/28/21  1443 07/28/21  1518   AGAP 29* 26*   K 7 4* 7 4*     Plan:  · Mechanical ventilation  · Started on insulin drip  · Bicarb drip  · Goal glucose range: 150-200  · Fluid hydration  · ABGs q4hr

## 2021-07-29 LAB
L PNEUMO1 AG UR QL IA.RAPID: NEGATIVE
PTH-INTACT SERPL-MCNC: 69.3 PG/ML (ref 18.4–80.1)
S PNEUM AG UR QL: NEGATIVE
T4 FREE SERPL-MCNC: 0.87 NG/DL (ref 0.76–1.46)

## 2021-07-29 NOTE — SEPSIS NOTE
Sepsis Note   [facility] 49 y o  female MRN: [id number]  Unit/Bed#: ICU 05 Encounter: [id number]      qSOFA     Row Name 07/28/21 1845 07/28/21 1830 07/28/21 1815 07/28/21 1800 07/28/21 1745    Altered mental status GCS < 15  --  --  --  --  --    Respiratory Rate > / =22  1  0  0  1  1    Systolic BP < / =245  --  0  --  --  --    Q Sofa Score  --  0  --  --  --    Row Name 07/28/21 1730 07/28/21 1715 07/28/21 1700 07/28/21 1645 07/28/21 1630    Altered mental status GCS < 15  --  --  --  --  --    Respiratory Rate > / =22  1  1  1  1  1    Systolic BP < / =335  --  --  --  --  --    Q Sofa Score  1  1  1  1  1    Row Name 07/28/21 1615 07/28/21 1600 07/28/21 1545 07/28/21 1530 07/28/21 1515    Altered mental status GCS < 15  --  1  --  --  --    Respiratory Rate > / =22  1  1  1  1  1    Systolic BP < / =211  --  --  --  --  --    Q Sofa Score  1  2  1  1  1    Row Name 07/28/21 1500 07/28/21 1445 07/28/21 1337 07/28/21 1330 07/28/21 1315    Altered mental status GCS < 15  --  --  --  --  --    Respiratory Rate > / =22  1  1  --  --  --    Systolic BP < / =365  --  --  0  0  0    Q Sofa Score  1  1  0  0  0    Row Name 07/28/21 1314 07/28/21 13:13:45 07/28/21 1235 07/28/21 12:24:52 07/28/21 12:21:56    Altered mental status GCS < 15  --  --  --  --  --    Respiratory Rate > / =22  --  --  --  --  --    Systolic BP < / =865  0  0  0  0  0    Q Sofa Score  0  0  0  0  0    Row Name 07/28/21 12:18:15 07/28/21 12:07:25             Altered mental status GCS < 15  --  --       Respiratory Rate > / =22  --  0       Systolic BP < / =897  0  1       Q Sofa Score  0  1           Initial Sepsis Screening     Row Name 07/28/21 1548                Is the patient's history suggestive of a new or worsening infection?   (!) Yes (Proceed)  -MW        Suspected source of infection  suspect infection, source unknown covid positive  -MW        Are two or more of the following signs & symptoms of infection both present and new to the patient?  --        Indicate SIRS criteria  Tachycardia > 90 bpm;Leukocytosis (WBC > 52878 IJL)  -MW        If the answer is yes to both questions, suspicion of sepsis is present  --        If severe sepsis is present AND tissue hypoperfusion perists in the hour after fluid resuscitation or lactate > 4, the patient meets criteria for SEPTIC SHOCK  --        Are any of the following organ dysfunction criteria present within 6 hours of suspected infection and SIRS criteria that are NOT considered to be chronic conditions? --        Organ dysfunction  MAP < 65 mmHg; Lactate > 2 0 mmol/L;Acute respiratory failure (new need for invasive or non-invasive mechanical ventilation)  -MW        Date of presentation of severe sepsis  07/28/21  -MW        Time of presentation of severe sepsis  1500  -MW        Tissue hypoperfusion persists in the hour after crystalloid fluid administration, evidenced, by either:  SBP < 90 mm/Hg ( ___ mm/Hg in comment field)  -MW        Was hypotension present within one hour of the conclusion of crystalloid fluid administration? Yes  -MW        Date of presentation of septic shock  07/28/21  -MW        Time of presentation of septic shock  1550  -MW          User Key  (r) = Recorded By, (t) = Taken By, (c) = Cosigned By    Initials Name Provider Type    LOI Lerma PA-C Physician Assistant               Default Flowsheet Data (last 720 hours)      Sepsis Reassess     Row Name 07/28/21 1710                   Repeat Volume Status and Tissue Perfusion Assessment Performed    Repeat Volume Status and Tissue Perfusion Assessment Performed  Yes  -NG           Volume Status and Tissue Perfusion Post Fluid Resuscitation * Must Document All *    Vital Signs Reviewed (HR, RR, BP, T)  Yes  -NG        Shock Index Reviewed  Yes  -NG        Arterial Oxygen Saturation Reviewed (POx, SaO2 or SpO2)  Yes (comment %) 78% on monitor with poor waveform quality  -NG        Cardio  (!) Bradycardia; Other (comment) muffled heart sounds  -NG        Pulmonary  Other (comment) course bilateral equal lung sounds  -NG        Capillary Refill  Sluggish  -NG        Peripheral Pulses  Radial;Dorsalis Pedis  -NG        Peripheral Pulse  +1  -NG        Dorsalis Pedis  +1  -NG        Skin  Cool; Mottled;Dry  -NG        Urine output assessed  None anuric  -NG           *OR*   Intensive Monitoring- Must Document One of the Following Four *:    Vital Signs Reviewed  --        * Central Venous Pressure (CVP or RAP)  --        * Central Venous Oxygen (SVO2, ScvO2 or Oxygen saturation via central catheter)  --        * Bedside Cardiovascular US in IVC diameter and % collapse  --        * Passive Leg Raise OR Crystalloid Challenge  --          User Key  (r) = Recorded By, (t) = Taken By, (c) = Cosigned By    Initials Name Provider Type    NG TONYA Ruff Nurse Practitioner

## 2021-07-29 NOTE — DISCHARGE SUMMARY
Discharge Summary - [facility] - [place] 49 y o  female MRN: [id number]    Unit/Bed#: ICU 05 Encounter: [id number] PCP: Willie Garnett PA-C    Admission Date:   Admission Orders (From admission, onward)     Ordered        07/28/21 1443  Inpatient Admission  Once                   * Cardiac arrest [facility])  Assessment & Plan   Around 6:19 p m became profoundly hypotensive with EKG abnormalities and was observed to have difficulty ventilating on the ventilator and ultimately underwent cardiac arrest receiving two doses of epi w/ return ROSC - epi infusion was added  During this time, Dr Jame Velázquez had multiple conversations with patient's  regarding patient's critical condition and poor prognosis in setting of multisystem organ failure and cardiac arrest   Patient's code status was changed to level 2 code  Soon after, patient experienced another cardiac arrest with asystole  Transcutaneous pacing was attempted with no capture as well as maxed epinephrine gtt without success  Cardiac Arrest in confirmed COVID positive + pt  Pre-hospital call to emergency department reported rhythm strip en route concerning for inferior wall MI, cardiology/cath team contacted at that time by ED physician who agreed to proceed with cardiac cath upon EMS arrival to ED  Patient was pulseless and CPR was initiated by the ED team   Patient was not sedated or paralyzed for intubation and ROSC achieved after 2 rounds of CPR  At this time, pupils were reportedly fixed and dilated  She was also requiring blood pressure support with Levophed  Cardiac cath: final report pending - unofficial report in cath lab revealed clear/normal coronary arteries     Patient has NO had prior episodes of chest pain or similar complaints reported by patients family   No PCP  Past cardiac history: NONE per family and does not have PCP   Family history: none reported           Recent Labs     07/28/21  1443 07/28/21  1754 07/28/21  [id number] David Danielle 0 36* 0 74* 0 84*       Plan:   · Continuous cardiopulm monitoring   · BP support   · Shock management   · Acidosis management   · Hyperkalemia management  · IVF resuscitation   · Labs:  ? Repeat troponin q3h x 3 or until peak, Lipid panel, HbA1C, TSH  · Meds:  ? Levophed and Vasopressin for BP support  ? ABX per covid protocol   · Cardiology onboard      Admitting Diagnosis: Cardiac arrest ([facility] ) [I46 9]  STEM[facility] ([facility] elevation [facility] infarction) [facility] ) [I21 3]    HPI: PER ORIGINAL H&P    "Elsa Knox is a 49 y o  female with confirmed + COVID, unvaccinated, w/ reportedly no pmhx (per family) who presented to emergency department in cardiac arrest      Patient brought by EMS for reported that they were called to have by patient's family as she was experiencing chest pain back pain and lethargy  At that time family reported patient having symptoms for 1-2 days  Of note, patient had to visit to providers, 7/24 to urgent care where COVID test obtained and seen at [facility] yesterday for back pain and leg weakness - unable to view full encounter details, she was given Robaxin  Per EMS patient conversant though lethargic with fingerstick glucose in the 200s  Pre-hospital call to emergency department reported rhythm strip en route concerning for inferior wall MI, cardiology/cath team contacted at that time by ED physician who agreed to proceed with cardiac cath upon EMS arrival to ED  When EMS arrived, as patient was being wheeled into ED, noted to have seizure-like event and became unresponsive, oral airway was in place will stomach contents were present  Patient was pulseless and CPR was initiated by the ED team  Patient was not sedated or paralyzed for intubation and ROSC achieved after 2 rounds of CPR  At this time, pupils were reportedly fixed and dilated  She was also requiring blood pressure support with Levophed   Initial venous gas on ed arrival showed pH 6 7 / 65 co2 / o2 51 / bicarb 6 7 w/ POCT glucose of >600 CBC showed wbc 15 and hgb of 17 no other labs were able to be obtained at this time due to access  Patient was then transported to the cath lab which revealed normal coronary arteries  Patient to be admitted to intensive care unit for further management and monitoring  Patients daughter and  provided with ongoing updates  History obtained from chart review and patients daughter who is also COVID positive "      Procedures Performed:   Orders Placed This Encounter   Procedures    Cardiac catheterization   [facility] ED ECG Documentation Only    ED ECG Documentation Only    Intubation       Summary of Hospital Course:     Patient arrived in ICU after cardiac catheterization  She remained in critical condition with multiorgan failure, intubated with continued hypotension hypothermia and acidosis  All labs able to be obtained which showed significant respiratory and metabolic derangements including but not limited to severe COVID, high anion gap metabolic acidosis, lactic acidosis, severe hyperglycemia, hyperkalemia, transaminitis, acute renal failure, severe sepsis, shock  Along with aggressive IV fluid resuscitation, patient required ongoing blood pressure support with Levophed 30mcg/min that required addition of vasopressin 0 04 as levo was maxed  Despite all medical interventions patient continued to decline  Around 6:19 p m patient became profoundly hypotensive with EKG changes and was observed to have difficulty ventilating on ventilator  Patient then experienced another cardiac arrest in which ROSC was achieved post 2 doses of epi  Epinephrine infusion was also started  During this time, Dr Pasquale Sheets called patients  Mabel Ann to discuss patient's continued decline despite all interventions  Patient transitioned to level 2 code    Soon after, patient experienced another cardiac arrest with asystole despite attempts at transcutaneous pacing which could not capture and maxing epinephrine infusion  Mrs Elsa Knox passed at 6:51 p m with staff at bedside  Family notified immediately  Significant Findings, Care, Treatment and Services Provided:     XR chest 1 view portable    Result Date: 2021  Impression: Endotracheal tube tip in the right mainstem bronchus  Please refer to follow-up chest x-ray report performed at 1:11 PM Workstation performed: [id number]     XR chest 1 view portable    Result Date: 2021  Impression: 1  Endotracheal tube tip within the proximal right mainstem bronchus with retraction approximately 3 to 4 cm recommended 2  Orogastric tube extending below left hemidiaphragm  Stomach no longer distended 3  Bilateral groundglass opacities in the lungs  Pending results of COVID-19 testing, if confirmed, this may represent viral pneumonia  The examination demonstrates a significant  finding and was documented as such in [facility] for liaison and referring practitioner immediate notification  Workstation performed: [id number]     Multiple follow-up chest x-rays completed to ensure proper placement of proper ET tube which was confirmed  Echo: pending     Cardiac Cath: Report by Dr Herber Bella DO "Normal LVEF, normal appearing epicardial vessels   No angiographic culprit for ECG/presentation, pH 6 6 and potassium 7 on labs sent off sheath in cath lab"    Disposition:      Final Diagnosis: Cardiac arrest    Medical Problems     Resolved Problems  Date Reviewed: 2021    None                Condition at Time of Death: Critical     Date, Time and Cause of Death    Date of Death: 21  Time of Death:  6:51 PM  Preliminary Cause of Death: Cardiac arrest  Entered by: Lore Dumont MD[AW1 1]     Attribution     AW1 1 Lore Dumont MD 21 19:21          Death Note:    INPATIENT DEATH NOTE  Elsa Knox 49 y o  female MRN: [id number]  Unit/Bed#: ICU 05 Encounter: [id number]    Date, Time and Cause of Death    Date of Death: 21  Time of Death:  6:51 PM  Preliminary Cause of Death: Cardiac arrest  Entered by: Danilo Uribe MD[AW1 1]     Attribution     AW1 1 Danilo Uribe MD 21 19:21           Patient's Information  Pronounced by: TONYA Burt  Did the patient's death occur in the ED?: No  Did the patient's death occur in the OR?: No  Did the patient's death occur less than 10 days post-op?: No  Did the patient's death occur within 24 hours of admission?: Yes  Was code status DNR at the time of death?: Yes    PHYSICAL EXAM:  Unresponsive to noxious stimuli, Spontaneous respirations absent, Breath sounds absent, Carotid pulse absent, Heart sounds absent, Pupillary light reflex absent and Corneal blink reflex absent    Medical Examiner notification criteria:  Patient  within 24 hours of arrival to hospital   Medical Examiner's office notified?:  Yes   Medical Examiner accepted case?:  pending  Name of Medical Examiner: pending    Family Notification  Was the family notified?: Yes  Date Notified: 21  Time Notified: 914  Notified by:  notified by Dr Teri Cheatham ;  Sister notified by resident Dr Kandy Montemayor  Name of Family Notified of Death: Zac Betancourt () sister ([place])   Relationship to Patient: Spouse, Sister  Family Notification Route: Telephone  Was the family told to contact a  home?: Yes  Name of  Home[de-identified] Unable to provide at this time - given callback phone number, ICU number    Autopsy Options:  Medical examiner notified, pending family call back after notification of death    Primary Service Attending Physician notified?:  yes - Attending:  Johny Mccray DO present in ICU at time of death    Physician/Resident responsible for completing Discharge Summary:  Danilo Uribe MD

## 2021-07-29 NOTE — UTILIZATION REVIEW
Initial Clinical Review    Admission: Date/Time/Statement:   Admission Orders (From admission, onward)     Ordered        07/28/21 1443  Inpatient Admission  Once                   Orders Placed This Encounter   Procedures    Inpatient Admission     Standing Status:   Standing     Number of Occurrences:   1     Order Specific Question:   Level of Care     Answer:   Critical Care [15]     Order Specific Question:   Estimated length of stay     Answer:   More than 2 Midnights     Order Specific Question:   Certification     Answer:   I certify that inpatient services are medically necessary for this patient for a duration of greater than two midnights  See H&P and MD Progress Notes for additional information about the patient's course of treatment  ED Arrival Information     Expected Arrival Acuity    - 7/28/2021 12:04 -         Means of arrival Escorted by Service Admission type    Ambulance [facility] EMS Critical Care/ICU Emergency         Arrival complaint            Chief Complaint   Patient presents with    Cardiac Arrest     Initial Presentation: 49 y female PMHx unvaccinated for COVID 23, diagnosed w COVID 7/24/2021 @ Urgent Care, 7/27 treated for back pain w anti inflammatory& sent home  Day of admission, patient more lethargic day of admission prompting family to [facility]  EMS arrival patient found awake & interactive but less responsive  ekg en route concerning for inferior wall MI  Cardiology consult for emergent cath on arrival  Being wheeled into ED patient w seizure like activity, unresponsive-oral airway in place w stomach contents present  Patient pulseless w CPR initialed by ED team, patient not sedated or paralyzed for intubation w ROSC achieve after 2 rounds of CPR  pupils were reportedly fixed and dilated  GCS=3   EKG  noted to have inferior wall ST-elevation and an MI alert was called   Taken to the cath lab and unofficial report  found to have normal coronaries with normal left ventricular function  Cont to require increasing doses of Levophed  Admit ICU septic shock, Pre hospital cardiac arrest, cardiac cath, acute hypoxic & hypercapnic resp failure secondary to COVID PNA  COVID 19 PNA w poss bacterial sepsis, ZAN, hyperkalemia, sev metabolic acidosis  Cont mechanical ventilation, IV pressors, IVF resuscitation, shock /acidosis management/ hyperkalemia management   Soon after, patient experienced another cardiac arrest with asystole   Transcutaneous pacing was attempted with no capture as well as maxed epinephrine gtt without success  Cath lab report :  Normal LVEF, normal appearing epicardial vessels  No angiographic culprit for ECG/presentation, pH 6 6 and potassium 7 on labs sent off sheath in cath lab    Cardiology NOTE  PEA arrest, reviewed her ECG/telemetry strips-does not know his QRS complexes, mildly bradycardic but lost blood pressure  No ventricular tachycardia or ventricular fibrillation  She remains hyperkalemic and acidotic-most recent potassium of 7 4 pH of 6 87, bicarbonate of 10  Reviewed echocardiogram, shows normal LV function-hyperdynamic  RV function is normal to low normal   No evidence of pericardial tamponade on echo  Utility of a pericardiocentesis is low  Has a moderate pericardial effusion,  respirophasic variation in left-sided -mitral valve inflow velocities is normal (does not suggest tamponade), there is exaggerated variation in right-sided  tricuspid valve inflow velocities (but she is also on positive pressure ventilation on a ventilator)  Etiology of her PEA arrest is acidosis and hyperkalemia  No evidence of pericardial tamponade on echo  Utility of a pericardiocentesis is low    Prognosis is very poor and even if her hemodynamics recover, likelihood of neurologic recovery is very poor    Date of Death: 7/28/21  Time of Death:  6:51 PM  Preliminary Cause of Death: Cardiac arrest  ED Triage Vitals   Temperature Pulse Respirations Blood Pressure SpO2 07/28/21 1615 07/28/21 1207 07/28/21 1207 07/28/21 1207 07/28/21 1218   (!) 90 1 °F (32 3 °C) (!) 0 (!) 0 (!) 0/0 97 %      Temp Source Heart Rate Source Patient Position - Orthostatic VS BP Location FiO2 (%)   07/28/21 1615 07/28/21 1314 -- -- 07/28/21 1437   Probe Monitor   100      Pain Score       --                 Wt Readings from Last 1 Encounters:   07/28/21 83 3 kg (183 lb 10 3 oz)     Additional Vital Signs: & GCS=  Date and Time Eye Opening Best Verbal Response Best Motor Response Etna Coma Scale Score   07/28/21 1600 1 1 1 3       Date/Time  Temp  Pulse  Resp  BP  MAP (mmHg)  Arterial Line BP  MAP  SpO2  FiO2 (%)  O2 Device   07/28/21 1845  90 °F (32 2 °C)Abnormal   93  28Abnormal   --  --  76/42  50 mmHg  --  --  --   07/28/21 1830  90 °F (32 2 °C)Abnormal   124Abnormal   15  114/84  94  118/62  81 mmHg  --  --  --   07/28/21 1815  90 5 °F (32 5 °C)Abnormal   55  0Abnormal   --  --  87/41  49 mmHg  84 %Abnormal   --  --   07/28/21 1800  90 7 °F (32 6 °C)Abnormal   61  22  --  --  97/43  53 mmHg  85 %Abnormal   --  --   07/28/21 1745  90 9 °F (32 7 °C)Abnormal   64  29Abnormal   --  --  104/45  57 mmHg  73 %Abnormal   --  --   07/28/21 1730  91 °F (32 8 °C)Abnormal   64  29Abnormal   --  --  102/43  53 mmHg  82 %Abnormal   --  --   07/28/21 1719  --  --  --  --  --  --  --  88 %Abnormal   100  Ventilator   07/28/21 1715  91 2 °F (32 9 °C)Abnormal   62  26Abnormal   --  --  104/53  62 mmHg  91 %  --  --   07/28/21 1700  91 4 °F (33 °C)Abnormal   66  28Abnormal   --  --  105/48  58 mmHg  --  --  --   07/28/21 1645  91 °F (32 8 °C)Abnormal   64  26Abnormal   --  --  102/49  59 mmHg  --  --  --   07/28/21 1630  90 3 °F (32 4 °C)Abnormal   68  25Abnormal   --  --  107/53  62 mmHg  84 %Abnormal   --  --   07/28/21 1615  90 1 °F (32 3 °C)Abnormal   69  26Abnormal   --  --  100/52  61 mmHg  100 %  --  --   07/28/21 1600  --  69  26Abnormal   --  --  97/49  59 mmHg  100 %  --  --   07/28/21 1550  --  --  -- --  --  --  --  94 %  100  Ventilator   07/28/21 1545  --  72  29Abnormal   --  --  94/50  59 mmHg  95 %  --  --   07/28/21 1530  --  72  25Abnormal   --  --  95/45  56 mmHg  --  --  --   07/28/21 1515  --  73  26Abnormal   --  --  94/48  58 mmHg  77 %Abnormal   --  --   07/28/21 1500  --  76  25Abnormal   --  --  103/51  62 mmHg  92 %  --  --   07/28/21 1445  --  69  24Abnormal   --  --  86/46  56 mmHg  91 %  --  --   07/28/21 1437  --  --  --  --  --  --  --  91 %  100  Ventilator   07/28/21 1434  --  --  --  --  --  86/44  57 mmHg  --  --  --   07/28/21 1337  --  60  --  116/70  --  --  --  --  --  --   07/28/21 1330  --  62  --  127/73  --  --  --  90 %  --  --   07/28/21 1321  --  64  --  --  --  --  --  --  --  --   07/28/21 1318  --  64  --  --  --  --  --  --  --  --   07/28/21 1315  --  64  --  109/50  72  --  --  --  --  --   07/28/21 1314  --  62  --    108/45Abnormal   --  --  --  --  --  --   Resp: intubated at 07/28/21 1314   07/28/21 13:13:45  --  --  --  108/45Abnormal   65  --  --  --  --  --   07/28/21 1312  --  64  --  --  --  --  --  --  --  --   07/28/21 1300  --  58  --  --  --  --  --  --  --  --   07/28/21 1255  --  62  --  --  --  --  --  --  --  --   07/28/21 1245  --  78  --  --  --  --  --  --  --  --   07/28/21 1240  --  80  --  --  --  --  --  68 %Abnormal   --  --   07/28/21 1235  --  72  --  227/107Abnormal   153  --  --  --  --  --   07/28/21 12:24:52  --  87  --  223/119Abnormal   --  --  --  97 %  --  --   07/28/21 12:21:56  --  108Abnormal   --  141/78  --  --  --  97 %  --  --   07/28/21 12:18:15  --  75  --  141/78  --  --  --  96 %  --  --   07/28/21 1218  --  75  --  --  --  --  --  97 %  --  --   07/28/21 12:16:29  --  75  --  --  --  --  --  --  --  --   07/28/21 12:14:09  --  145Abnormal   --  --  --  --  --  --  --  --   07/28/21 12:10:21  --  0Abnormal   --  --  --  --  --  --  --  --   07/28/21 12:07:25  --  0Abnormal   0Abnormal   0/0Abnormal   --  --  --  --  --  -- Weights (last 14 days) before discharge    Date/Time  Weight  Height   07/28/21 1444  83 3 kg (183 lb 10 3 oz)  5' 6" (1 676 m)       Pertinent Labs/Diagnostic Test Results:   Results from last 7 days   Lab Units 07/28/21  1358   SARS-COV-2  Positive*     Results from last 7 days   Lab Units 07/28/21 1837 07/28/21  1518 07/28/21  1400 07/28/21  1249 07/28/21  1247   WBC Thousand/uL  --  8 40  --  15 30*  --    HEMOGLOBIN g/dL  --  14 1  --  17 9*  --    I STAT HEMOGLOBIN g/dl 8 2*  --  17 3*  --  18 4*   HEMATOCRIT %  --  43 2  --  55 7*  --    HEMATOCRIT, ISTAT % 24*  --  51*  --  54*   PLATELETS Thousands/uL  --  186  --  156  --    BANDS PCT %  --   --   --  9*  --          Results from last 7 days   Lab Units 07/28/21 1838 07/28/21  1837 07/28/21  1754 07/28/21  1518 07/28/21  1443 07/28/21  1400   SODIUM mmol/L 135*  --   --  119* 112*  --    POTASSIUM mmol/L 6 7*  --   --  7 4* 7 4*  --    CHLORIDE mmol/L 91*  --   --  81* 75*  --    CO2 mmol/L 18*  --   --  12* 8*  --    CO2, I-STAT mmol/L  --  20*  --   --   --  11*   ANION GAP mmol/L 26*  --   --  26* 29*  --    BUN mg/dL 60*  --   --  68* 67*  --    CREATININE mg/dL 2 99*  --   --  3 57* 3 61*  --    EGFR ml/min/1 73sq m 17  --   --  14 14  --    CALCIUM mg/dL 11 0*  --   --  10 0 10 4*  --    CALCIUM, IONIZED mmol/L  --   --   --  1 33*  --   --    MAGNESIUM mg/dL  --   --  3 6*  3 5* 4 0*  --   --    PHOSPHORUS mg/dL  --   --  15 4*  >18 0* >18 0*  --   --      Results from last 7 days   Lab Units 07/28/21  1518 07/28/21  1443   AST U/L 1,277* 1,369*   ALT U/L 326* 420*   ALK PHOS U/L 207* 236*   TOTAL PROTEIN g/dL 3 8* 5 2*   ALBUMIN g/dL 1 2* 1 7*   TOTAL BILIRUBIN mg/dL 0 53 0 45     Results from last 7 days   Lab Units 07/28/21  1752 07/28/21  1555   POC GLUCOSE mg/dl >500* >500*     Results from last 7 days   Lab Units 07/28/21  1838 07/28/21  1518 07/28/21  1443   GLUCOSE RANDOM mg/dL 542* 857* 913*             BETA-HYDROXYBUTYRATE   Date Value Ref Range Status   07/28/2021 2 2 (H) <0 6 mmol/L Final      Results from last 7 days   Lab Units 07/28/21  1540   PH ART  6 876*   PCO2 ART mm Hg 56 2*   PO2 ART mm Hg 66 2*   HCO3 ART mmol/L 10 2*   BASE EXC ART mmol/L -23 4   O2 CONTENT ART mL/dL 15 3*   O2 HGB, ARTERIAL % 76 8*   ABG SOURCE  Line, Arterial         Results from last 7 days   Lab Units 07/28/21  1837 07/28/21  1400 07/28/21  1247   PH, TASH I-STAT   --  6 655*  --    PCO2, TASH ISTAT mm HG  --  79 1*  --    PO2, TASH ISTAT mm HG  --  86 0*  --    HCO3, TASH ISTAT mmol/L  --  8 8*  --    I STAT BASE EXC mmol/L -12* <-30* -29*   I STAT O2 SAT % 84 74 48*   ISTAT PH ART  7 063*  --  6 735*   I STAT ART PCO2 mm HG 62 2*  --  65 2*   I STAT ART PO2 mm HG 70 0*  --  51 0*   I STAT ART HCO3 mmol/L 17 7*  --  8 7*     Results from last 7 days   Lab Units 07/28/21  1518   CK TOTAL U/L 31,843*   CK MB INDEX % 1 2   CK MB ng/mL 385 3*     Results from last 7 days   Lab Units 07/28/21  1841 07/28/21  1754 07/28/21  1443   TROPONIN I ng/mL 0 84* 0 74* 0 36*     Results from last 7 days   Lab Units 07/28/21  1518   D-DIMER QUANTITATIVE ug/ml FEU 11 78*     Results from last 7 days   Lab Units 07/28/21  1518 07/28/21  1250   PROTIME seconds 22 8* 19 0*   INR  2 01* 1 59*   PTT seconds  --  75*     Results from last 7 days   Lab Units 07/28/21  1518 07/28/21  1443   TSH 3RD GENERATON uIU/mL 20 202* 32 656*     Results from last 7 days   Lab Units 07/28/21  1518   PROCALCITONIN ng/ml 5 50*     Results from last 7 days   Lab Units 07/28/21  1838 07/28/21  1754 07/28/21  1518 07/28/21  1443   LACTIC ACID mmol/L 18 5* 15 3* 14 7* 16 4*             Results from last 7 days   Lab Units 07/28/21  1518   NT-PRO BNP pg/mL 6,874*     Results from last 7 days   Lab Units 07/28/21  1518   FERRITIN ng/mL 10,885*     Results from last 7 days   Lab Units 07/28/21  1518   HEP B S AG  Non-reactive   HEP C AB  Non-reactive   HEP B C IGM  Non-reactive   HEP B C TOTAL AB Non-reactive         Results from last 7 days   Lab Units 07/28/21  1518   CRP mg/L 50 1*         Results from last 7 days   Lab Units 07/28/21  1709   CLARITY UA  Clear   COLOR UA  Yellow   SPEC GRAV UA  1 025   PH UA  5 5   GLUCOSE UA mg/dl >=1000 (1%)*   KETONES UA mg/dl Trace*   BLOOD UA  Large*   PROTEIN UA mg/dl 30 (1+)*   NITRITE UA  Negative   BILIRUBIN UA  Negative   UROBILINOGEN UA E U /dl 0 2   LEUKOCYTES UA  Elevated glucose may cause decreased leukocyte values  See urine microscopic for [facility] result/*   WBC UA /hpf 1-2   RBC UA /hpf 1-2   BACTERIA UA /hpf Moderate*   EPITHELIAL CELLS WET PREP /hpf None Seen     Results from last 7 days   Lab Units 07/28/21  1708   STREP PNEUMONIAE ANTIGEN, URINE  Negative   LEGIONELLA URINARY ANTIGEN  Negative         Results from last 7 days   Lab Units 07/28/21  1754   AMPH/METH  Negative   BARBITURATE UR  Negative   BENZODIAZEPINE UR  Negative   COCAINE UR  Negative   METHADONE URINE  Negative   OPIATE UR  Negative   PCP UR  Negative   THC UR  Negative     Results from last 7 days   Lab Units 07/28/21  1754   ETHANOL LVL mg/dL <3   ACETAMINOPHEN LVL ug/mL 21 4*                 Results from last 7 days   Lab Units 07/28/21  1520 07/28/21  1515   BLOOD CULTURE  Received in Microbiology Lab  Culture in Progress  Received in Microbiology Lab  Culture in Progress  Results from last 7 days   Lab Units 07/28/21  1249   TOTAL COUNTED  100     7/28 echo completed & results unavailable per cardiology  Reviewed echocardiogram, shows normal LV function-hyperdynamic  RV function is normal to low normal   No evidence of pericardial tamponade on echo  Utility of a pericardiocentesis is low    Has a moderate pericardial effusion,  respirophasic variation in left-sided -mitral valve inflow velocities is normal (does not suggest tamponade), there is exaggerated variation in right-sided  tricuspid valve inflow velocities (but she is also on positive pressure ventilation on a ventilator    7/28 ekg predominantly inferolateral ST elevations   7/28 cardiac cat  CARDIAC STRUCTURES:  CORONARY CIRCULATION:  Left main: Normal   LAD: Normal   Circumflex: Normal   RCA: Normal   Global left ventricular function was normal  EF calculated by contrast ventriculography was 65 %  ED Treatment:   Medication Administration from 07/28/2021 1203 to 07/28/2021 1432       Date/Time Order Dose Route Action     07/28/2021 1209 EPINEPHrine (ADRENALIN) injection 1 mg Intravenous Given     07/28/2021 1204 EPINEPHrine (ADRENALIN) injection 1 mg Intravenous Given     07/28/2021 1208 sodium bicarbonate 8 4 % injection 50 mEq Intravenous Given     07/28/2021 1209 calcium chloride 1 g/10 mL injection 1 g Intravenous Given     07/28/2021 1303 norepinephrine (LEVOPHED) 4 mg in sodium chloride 0 9 % 250 mL infusion 5 mcg/min  Restarted     07/28/2021 1222 norepinephrine (LEVOPHED) 4 mg in sodium chloride 0 9 % 250 mL infusion 5 mcg/min Intravenous New Bag     07/28/2021 1243 heparin (porcine) injection 4,000 Units 4,000 Units Intravenous Given     07/28/2021 1313 ticagrelor (BRILINTA) tablet 180 mg 180 mg Oral Given     07/28/2021 1410 sodium bicarbonate 8 4 % injection 50 mEq Intravenous Given     07/28/2021 1430 norepinephrine (LEVOPHED) 4 mg (STANDARD CONCENTRATION) IV in sodium chloride 0 9% 250 mL 11 mcg/min Intravenous Rate/Dose Change     07/28/2021 1355 norepinephrine (LEVOPHED) 4 mg (STANDARD CONCENTRATION) IV in sodium chloride 0 9% 250 mL 10 mcg/min Intravenous Rate/Dose Change     07/28/2021 1328 norepinephrine (LEVOPHED) 4 mg (STANDARD CONCENTRATION) IV in sodium chloride 0 9% 250 mL 5 mcg/min Intravenous New Bag     07/28/2021 1345 perflutren lipid microsphere (DEFINITY) injection 0 6 mL/min Intravenous Given        No past medical history on file    Present on Admission:  **None**      Admitting Diagnosis: [facility] [I46 9]  STEMI (ST elevation myocardial infarction) ([facility] ) [I21 3]  Age/Sex: 49 y o  female  Admission Orders:  Mechanical ventilation  continual cardio pulmonary & pulse oximetry monitoring  Neuro checks q1 hr  NGT  Arterial line   scd  Scheduled Medications:  IV dexamethasone (PF) (DECADRON) injection 8 3 mg 7/28 x1  Dose: 0 1 mg/kg  Weight Dosing Info: 83 3 kg  Freq: Every 12 hours Route: IV  IV calcium gluconate 1 g in sodium chloride 0 9% 50 mL (premix) 7/28 1711  Dose: 1 g  Freq: Once Route: IV  IV doxycycline (VIBRAMYCIN) 100 mg in sodium chloride 0 9 % 100 mL IVPB 7/28 1640  Dose: 100 mg  Freq: Every 12 hours Route: IV  IV doxycycline (VIBRAMYCIN) 100 mg in sodium chloride 0 9 % 100 mL IVPB 7/28 1624  Dose: 100 mg  Freq: Every 12 hours Route: IV  IV heparin (porcine) injection 4,000 Units 6/28 1243  Dose: 4,000 Units  Freq: Once Route: IV  IV insulin regular (HumuLIN R,NovoLIN R) injection 10 Units 7/28 1536,  1706  Dose: 10 Units  Freq: Once Route: IV    NEB levalbuterol (XOPENEX) inhalation solution 1 25 mg 7/28  Dose: 1 25 mg  Freq: 3 times daily (RESP) Route: NEBULIZATION    IV multi-electrolyte (PLASMALYTE-A/ISOLYTE-S PH 7 4) IV solution 1,000 mL 7/28 1645  Dose: 1,000 mL  Freq: Once Route: IV    IV multi-electrolyte (PLASMALYTE-A/ISOLYTE-S PH 7 4) IV solution 1,000 mL   Dose: 1,000 mL  Freq: Once Route: IV7/28 1715    IV multi-electrolyte (ISOLYTE-S PH 7 4) bolus 499 mL 7/28 1734   Dose: 499 mL  Freq: Once Route: IV    IV sodium bicarbonate 8 4 % injection 100 mEq x3 doses  Dose: 100 mEq  Freq: Once Route: IV    PO ticagrelor (BRILINTA) tablet 180 mg 7/28 1313  Dose: 180 mg  Freq: Once Route: PO  IV vecuronium (NORCURON) injection 10 mg 7/28 1808  Dose: 10 mg  Freq:  Once Route: IV    Continuous IV Infusions:  IV norepinephrine (LEVOPHED) 4 mg (STANDARD CONCENTRATION) IV in sodium chloride 0 9% 250 mL x8 adjustments   Freq: Code/trauma/sedation continuous med Route: IV  Last Dose: Stopped (07/28/21 1921)    IV propofol (DIPRIVAN) 1000 mg in 100 mL infusion (premix) 7/28 x3 doses Rate: 3-30 mL/hr Dose: 5-50 mcg/kg/min  Weight Dosing Info: 100 kg (Order-Specific)  Freq: Titrated Route: IV    IV sodium bicarbonate 150 mEq in dextrose 5 % 1,000 mL infusion 7/28 1537  Rate: 150 mL/hr Dose: 150 mL/hr  Freq: Continuous Route: IV  Last Dose: Stopped (07/28/21 1922    IV vasopressin (PITRESSIN) 20 Units in sodium chloride 0 9 % 100 mL infusion7/28 1657  Rate: 12 mL/hr Dose: 0 04 Units/min  Freq: Continuous Route: IV  PRN Meds:  IV calcium chloride 1 g/10 mL injection x2   Freq: Code/trauma/sedation medication    IV EPINEPHrine (ADRENALIN) injection 7/28 x3   Freq: Code/trauma/sedation medication Route: IV  IP CONSULT TO CARDIOLOGY    Network Utilization Review Department  ATTENTION: Please call with any questions or concerns to [phone] and carefully listen to the prompts so that you are directed to the right person  All voicemails are confidential   Diogenes Lomas all requests for admission clinical reviews, approved or denied determinations and any other requests to dedicated fax number below belonging to the campus where the patient is receiving treatment   List of dedicated fax numbers for the Facilities:  [address] DENIALS (Administrative/Medical Necessity) [phone]   [address] (Maternity/NICU/Pediatrics) [phone]   [address] [place] [address] [address] [place] [address] [address] [facility] [place] [address] [phone]

## 2021-07-29 NOTE — NURSING NOTE
PTs LDA's were removed after calling the  confirming it would not be a coroners case  After the body was cleaned for the morgue and LDA's removed, the family requested an autopsy  ETT still remaining

## 2021-07-29 NOTE — ASSESSMENT & PLAN NOTE
Around 6:19 p m became profoundly hypotensive with EKG abnormalities and was observed to have difficulty ventilating on the ventilator and ultimately underwent cardiac arrest receiving two doses of epi w/ return ROSC - epi infusion was added  During this time, Dr Antonia Benton had multiple conversations with patient's  regarding patient's critical condition and poor prognosis in setting of multisystem organ failure and cardiac arrest   Patient's code status was changed to level 2 code  Soon after, patient experienced another cardiac arrest with asystole  Transcutaneous pacing was attempted with no capture as well as maxed epinephrine gtt without success  Cardiac Arrest in confirmed COVID positive + pt  Pre-hospital call to emergency department reported rhythm strip en route concerning for inferior wall MI, cardiology/cath team contacted at that time by ED physician who agreed to proceed with cardiac cath upon EMS arrival to ED  Patient was pulseless and CPR was initiated by the ED team   Patient was not sedated or paralyzed for intubation and ROSC achieved after 2 rounds of CPR  At this time, pupils were reportedly fixed and dilated  She was also requiring blood pressure support with Levophed  Cardiac cath: final report pending - unofficial report in cath lab revealed clear/normal coronary arteries    Patient has NO had prior episodes of chest pain or similar complaints reported by patients family   No PCP  Past cardiac history: NONE per family and does not have PCP   Family history: none reported    Recent Labs     07/28/21  1443 07/28/21  1754 07/28/21  1841   TROPONINI 0 36* 0 74* 0 84*      Plan:    Continuous cardiopulm monitoring    BP support    Shock management    Acidosis management    Hyperkalemia management   IVF resuscitation    Labs:  o Repeat troponin q3h x 3 or until peak, Lipid panel, HbA1C, TSH   Meds:  o Levophed and Vasopressin for BP support  o ABX per covid protocol [facility] onboard

## 2021-07-30 ENCOUNTER — TELEPHONE (OUTPATIENT)
Dept: CASE MANAGEMENT | Facility: HOSPITAL | Age: 56
End: 2021-07-30

## 2021-07-30 LAB
AMPHETAMINES UR QL SCN: NEGATIVE NG/ML
BARBITURATES UR QL SCN: NEGATIVE NG/ML
BENZODIAZ UR QL: NEGATIVE NG/ML
BZE UR QL: NEGATIVE NG/ML
CANNABINOIDS UR QL SCN: NEGATIVE NG/ML
METHADONE UR QL SCN: NEGATIVE NG/ML
OPIATES UR QL: NEGATIVE NG/ML
PCP UR QL: NEGATIVE NG/ML
PROPOXYPH UR QL SCN: NEGATIVE NG/ML

## 2021-07-30 NOTE — TELEPHONE ENCOUNTER
CM contacted patients  Hoa Ramirez to follow up on  home choice  Patient's  requested CM speak to his daughter and he passed the phone  Daughter reported that they have decided on [place] in [place]  CM verbalized condolences and thanked them for their assistance  Nurse manager and hospital supervisor both notified of the above information

## 2021-07-30 NOTE — UTILIZATION REVIEW
Notification of Discharge   This is a Notification of Discharge from our facility [address]  Please be advised that this patient has been discharge from our facility  Below you will find the admission and discharge date and time including the patients disposition  UTILIZATION REVIEW CONTACT:  Lena Gene  Utilization   Network Utilization Review Department  Phone: [phone] x carefully listen to the prompts  All voicemails are confidential   Email: [email or web address]     PHYSICIAN ADVISORY SERVICES:  FOR [id number] REVIEW - MEDICAL NECESSITY DENIAL  Phone: [phone]  Fax: [phone]  Email: [email or web address]     PRESENTATION DATE: 2021 12:04 PM  OBERVATION ADMISSION DATE:   INPATIENT ADMISSION DATE: 21  2:43 PM   DISCHARGE DATE: 2021  9:02 PM  DISPOSITION:        IMPORTANT INFORMATION:  Send all requests for admission clinical reviews, approved or denied determinations and any other requests to dedicated fax number below belonging to the campus where the patient is receiving treatment   List of dedicated fax numbers:  [address] DENIALS (Administrative/Medical Necessity) [phone]   [address] (Maternity/NICU/Pediatrics) [phone]   Kamron Morocho [phone]   Celena Shafer [phone]   Vivian Nicole [phone]   [facility] [address] [facility] [phone]   [facility]  [phone]   [address] [facility]  [address] [facility] [address] [facility] [phone]

## 2021-08-02 LAB
ATRIAL RATE: 115 BPM
BACTERIA BLD CULT: NORMAL
QRS AXIS: 51 DEGREES
QRSD INTERVAL: 64 MS
QT INTERVAL: 254 MS
QTC INTERVAL: 332 MS
T WAVE AXIS: 39 DEGREES
VENTRICULAR RATE: 103 BPM

## 2021-08-04 LAB
BACTERIA BLD CULT: NORMAL
GRAM STN SPEC: NORMAL

## 2021-08-06 ENCOUNTER — TELEPHONE (OUTPATIENT)
Dept: PULMONOLOGY | Facility: CLINIC | Age: 56
End: 2021-08-06

## 2021-08-06 NOTE — TELEPHONE ENCOUNTER
Per family request, patient underwent autopsy  I received a phone call from Dr Kate Boyer, the forensic pathologist that performed the autopsy this morning  He confirms that the cause of death is COVID-19  She had extensive changes in the lungs, proximal LAD coronary occlusion without evidence of acute myocardial infarction, pericardial effusion and hepatic steatosis  I called the family ( and daughter) and provided them with the preliminary report  Final report, with additional culture data will be submitted in 2-3 months per Dr Kate Boyer  I answered all of the family's questions as it relates to her hospital course at [facility]  They also asked me questions regarding her evaluation at [facility], however I was only able to answer questions pertinent to what I could view in the medical record